# Patient Record
Sex: MALE | Race: WHITE | NOT HISPANIC OR LATINO
[De-identification: names, ages, dates, MRNs, and addresses within clinical notes are randomized per-mention and may not be internally consistent; named-entity substitution may affect disease eponyms.]

---

## 2017-01-27 ENCOUNTER — APPOINTMENT (OUTPATIENT)
Dept: NEUROLOGY | Facility: CLINIC | Age: 22
End: 2017-01-27

## 2017-09-04 ENCOUNTER — TRANSCRIPTION ENCOUNTER (OUTPATIENT)
Age: 22
End: 2017-09-04

## 2019-04-06 ENCOUNTER — EMERGENCY (EMERGENCY)
Facility: HOSPITAL | Age: 24
LOS: 1 days | Discharge: HOME | End: 2019-04-06
Attending: EMERGENCY MEDICINE
Payer: COMMERCIAL

## 2019-04-06 VITALS
DIASTOLIC BLOOD PRESSURE: 90 MMHG | RESPIRATION RATE: 17 BRPM | WEIGHT: 205.03 LBS | HEIGHT: 74 IN | TEMPERATURE: 98 F | HEART RATE: 88 BPM | SYSTOLIC BLOOD PRESSURE: 145 MMHG | OXYGEN SATURATION: 95 %

## 2019-04-06 VITALS
OXYGEN SATURATION: 96 % | RESPIRATION RATE: 18 BRPM | TEMPERATURE: 98 F | DIASTOLIC BLOOD PRESSURE: 71 MMHG | SYSTOLIC BLOOD PRESSURE: 126 MMHG | HEART RATE: 66 BPM

## 2019-04-06 DIAGNOSIS — Z98.89 OTHER SPECIFIED POSTPROCEDURAL STATES: Chronic | ICD-10-CM

## 2019-04-06 PROCEDURE — 99282 EMERGENCY DEPT VISIT SF MDM: CPT

## 2019-04-06 PROCEDURE — 99283 EMERGENCY DEPT VISIT LOW MDM: CPT

## 2019-04-06 NOTE — ED PROVIDER NOTE - NSFOLLOWUPINSTRUCTIONS_ED_ALL_ED_FT
See your Primary Doctor this week for follow up and reevaluation -- call to discuss.    Stay well hydrate, eat regular healthy meals, get plenty of adequate rest.    ACETAMINOPHEN and/or IBUPROFEN as directed for fever/pain -- see medication warnings.    Seek immediate medical care for new/worsening symptoms/concerns.

## 2019-04-06 NOTE — ED PROVIDER NOTE - NS ED ROS FT
Please see HPI section of chart for further detailed Review of Systems    fevers (max 102), chills, headache, nausea, body aches, congestion, cough (productive), mild photophobia, sore throat

## 2019-04-06 NOTE — ED PROVIDER NOTE - ATTENDING CONTRIBUTION TO CARE
------------ATTENDING NOTE------------   healthy vaccinated pt w/ mother sent to ED by Urgent Care, pt c/o < 24 hrs of nasal congestion, clear rhinorrhea, unproductive cough, fevers, gradual onset mild dull throbbing tension-type headache, significantly resolves w/ acetaminophen/ibuprofen, no underlying pulmonary disease, overall very well appearing, tolerating PO, clear MMM, clear TMs, clear chest w/o distress, normal cardiac exam, soft benign abdomen w/o mass/organomegaly, no rash or diffuse lymphadenopathy, c/w viral process, pt's mother very anxious and upset over limitation and lack of additional testing, nml VS at [place], in depth dw all about ddx, tx, cr, continued close outpt fu.  - Eugene Victor MD   -----------------------------------------------

## 2019-04-06 NOTE — ED ADULT NURSE NOTE - CHIEF COMPLAINT QUOTE
Cold like symptoms, fevers ans chills, denies neck stiffness. Sent by PMD for r/o meningitis, + photophobia and headache

## 2019-04-06 NOTE — ED PROVIDER NOTE - CLINICAL SUMMARY MEDICAL DECISION MAKING FREE TEXT BOX
23M w/ 1 day of fevers (max 102), chills, headache, nausea, body aches, congestion, cough, mild photophobia. Non toxic appearing, afebrile in ED. Exam non focal. Neck supple. Vitals wnl. High suspicion for viral illness. Will attempt PO challenge, anticipate patient will be d/c home with return precautions, anticipatory guidance, and instructed to f/u with primary medical doctor.   Caio Toro MD, PGY2 Emergency Medicine see attending note / orders for clinical course / interpretations

## 2019-04-06 NOTE — ED PROVIDER NOTE - OBJECTIVE STATEMENT
23M, hx depression/anxiety, presents to ED from urgent care with his mother for chief complaint of fever and viral-like illness. Patient stated that he had sore throat yesterday and went to urgent care where flu and rapid strep were negative. Overnight, patient reportedly developed fevers (max 102), chills, headache, nausea, body aches, congestion, cough (productive), mild photophobia. Went to Urgent Care again and was sent to ED for eval. Denies associated chest pain, shortness of breath, abdominal pain, diarrhea or constipation, dysuria, neck pain, neck stiffness, vomiting, AMS. No recent illness, travel. Attends college, lives at home. No flu shot this season. Took tylenol at 12pm, advil at 1:30pm. No longer febrile in ED.

## 2019-04-06 NOTE — ED PROVIDER NOTE - ADDITIONAL RISK FACTOR FREE TEXT BOX
23M w/ 1 day of fevers (max 102), chills, headache, nausea, body aches, congestion, cough, mild photophobia. Non toxic appearing, afebrile in ED. Exam non focal. Neck supple. Vitals wnl. High suspicion for viral illness. Will attempt PO challenge, anticipate patient will be d/c home with return precautions, anticipatory guidance, and instructed to f/u with primary medical doctor.   Caio Toro MD, PGY2 Emergency Medicine

## 2019-04-06 NOTE — ED ADULT NURSE NOTE - OBJECTIVE STATEMENT
24 yo M aaox4 fever, body aches and sore throat. Also complains of photophobia. Pt denies cp dizziness weakness or SOB.   Denies headache or change in vision. VS stable no acute distress.

## 2019-04-06 NOTE — ED PROVIDER NOTE - PROGRESS NOTE DETAILS
Spoke with Dr Mcintyre (PCP) who called to check in on patient while in ED  Will instruct patient to f/u with Dr Mcintyre this week for further care   Caio Toro MD, PGY2 Emergency Medicine

## 2019-04-06 NOTE — ED PROVIDER NOTE - MUSCULOSKELETAL, MLM
Spine appears normal, range of motion is not limited, no muscle or joint tenderness. Neck supple, full ROM of neck.

## 2019-04-06 NOTE — ED ADULT TRIAGE NOTE - CHIEF COMPLAINT QUOTE
Cold like symptoms, fevers ans chills, denies neck stiffness. Sent by PMD for r/o meningitis. Cold like symptoms, fevers ans chills, denies neck stiffness. Sent by PMD for r/o meningitis, + photophobia and headache

## 2020-10-15 ENCOUNTER — TRANSCRIPTION ENCOUNTER (OUTPATIENT)
Age: 25
End: 2020-10-15

## 2020-12-31 ENCOUNTER — TRANSCRIPTION ENCOUNTER (OUTPATIENT)
Age: 25
End: 2020-12-31

## 2022-02-19 ENCOUNTER — INPATIENT (INPATIENT)
Facility: HOSPITAL | Age: 27
LOS: 5 days | Discharge: HOME | DRG: 158 | End: 2022-02-25
Attending: INTERNAL MEDICINE | Admitting: INTERNAL MEDICINE
Payer: COMMERCIAL

## 2022-02-19 VITALS
DIASTOLIC BLOOD PRESSURE: 92 MMHG | TEMPERATURE: 99 F | HEART RATE: 96 BPM | SYSTOLIC BLOOD PRESSURE: 144 MMHG | HEIGHT: 74 IN | RESPIRATION RATE: 26 BRPM | OXYGEN SATURATION: 99 % | WEIGHT: 184.97 LBS

## 2022-02-19 DIAGNOSIS — Z98.89 OTHER SPECIFIED POSTPROCEDURAL STATES: Chronic | ICD-10-CM

## 2022-02-19 DIAGNOSIS — K12.30 ORAL MUCOSITIS (ULCERATIVE), UNSPECIFIED: ICD-10-CM

## 2022-02-19 LAB
ALBUMIN SERPL ELPH-MCNC: 5.2 G/DL — HIGH (ref 3.3–5)
ALP SERPL-CCNC: 59 U/L — SIGNIFICANT CHANGE UP (ref 40–120)
ALT FLD-CCNC: 9 U/L — LOW (ref 10–45)
ANION GAP SERPL CALC-SCNC: 16 MMOL/L — SIGNIFICANT CHANGE UP (ref 5–17)
APPEARANCE UR: CLEAR — SIGNIFICANT CHANGE UP
AST SERPL-CCNC: 8 U/L — LOW (ref 10–40)
BACTERIA # UR AUTO: NEGATIVE — SIGNIFICANT CHANGE UP
BASOPHILS # BLD AUTO: 0.03 K/UL — SIGNIFICANT CHANGE UP (ref 0–0.2)
BASOPHILS NFR BLD AUTO: 0.3 % — SIGNIFICANT CHANGE UP (ref 0–2)
BILIRUB SERPL-MCNC: 0.6 MG/DL — SIGNIFICANT CHANGE UP (ref 0.2–1.2)
BILIRUB UR-MCNC: NEGATIVE — SIGNIFICANT CHANGE UP
BUN SERPL-MCNC: 11 MG/DL — SIGNIFICANT CHANGE UP (ref 7–23)
CALCIUM SERPL-MCNC: 10.4 MG/DL — SIGNIFICANT CHANGE UP (ref 8.4–10.5)
CHLORIDE SERPL-SCNC: 97 MMOL/L — SIGNIFICANT CHANGE UP (ref 96–108)
CO2 SERPL-SCNC: 26 MMOL/L — SIGNIFICANT CHANGE UP (ref 22–31)
COLOR SPEC: YELLOW — SIGNIFICANT CHANGE UP
CREAT SERPL-MCNC: 1.04 MG/DL — SIGNIFICANT CHANGE UP (ref 0.5–1.3)
DIFF PNL FLD: NEGATIVE — SIGNIFICANT CHANGE UP
EOSINOPHIL # BLD AUTO: 0.21 K/UL — SIGNIFICANT CHANGE UP (ref 0–0.5)
EOSINOPHIL NFR BLD AUTO: 1.8 % — SIGNIFICANT CHANGE UP (ref 0–6)
EPI CELLS # UR: 2 /HPF — SIGNIFICANT CHANGE UP
GLUCOSE SERPL-MCNC: 97 MG/DL — SIGNIFICANT CHANGE UP (ref 70–99)
GLUCOSE UR QL: NEGATIVE — SIGNIFICANT CHANGE UP
HCT VFR BLD CALC: 45.6 % — SIGNIFICANT CHANGE UP (ref 39–50)
HGB BLD-MCNC: 15.3 G/DL — SIGNIFICANT CHANGE UP (ref 13–17)
HIV 1 & 2 AB SERPL IA.RAPID: SIGNIFICANT CHANGE UP
HYALINE CASTS # UR AUTO: 19 /LPF — HIGH (ref 0–2)
IMM GRANULOCYTES NFR BLD AUTO: 0.3 % — SIGNIFICANT CHANGE UP (ref 0–1.5)
KETONES UR-MCNC: ABNORMAL
LEUKOCYTE ESTERASE UR-ACNC: NEGATIVE — SIGNIFICANT CHANGE UP
LYMPHOCYTES # BLD AUTO: 1.81 K/UL — SIGNIFICANT CHANGE UP (ref 1–3.3)
LYMPHOCYTES # BLD AUTO: 15.3 % — SIGNIFICANT CHANGE UP (ref 13–44)
MCHC RBC-ENTMCNC: 29.1 PG — SIGNIFICANT CHANGE UP (ref 27–34)
MCHC RBC-ENTMCNC: 33.6 GM/DL — SIGNIFICANT CHANGE UP (ref 32–36)
MCV RBC AUTO: 86.9 FL — SIGNIFICANT CHANGE UP (ref 80–100)
MONOCYTES # BLD AUTO: 1.2 K/UL — HIGH (ref 0–0.9)
MONOCYTES NFR BLD AUTO: 10.2 % — SIGNIFICANT CHANGE UP (ref 2–14)
NEUTROPHILS # BLD AUTO: 8.53 K/UL — HIGH (ref 1.8–7.4)
NEUTROPHILS NFR BLD AUTO: 72.1 % — SIGNIFICANT CHANGE UP (ref 43–77)
NITRITE UR-MCNC: NEGATIVE — SIGNIFICANT CHANGE UP
NRBC # BLD: 0 /100 WBCS — SIGNIFICANT CHANGE UP (ref 0–0)
PH UR: 6 — SIGNIFICANT CHANGE UP (ref 5–8)
PLATELET # BLD AUTO: 330 K/UL — SIGNIFICANT CHANGE UP (ref 150–400)
POTASSIUM SERPL-MCNC: 3.5 MMOL/L — SIGNIFICANT CHANGE UP (ref 3.5–5.3)
POTASSIUM SERPL-SCNC: 3.5 MMOL/L — SIGNIFICANT CHANGE UP (ref 3.5–5.3)
PROT SERPL-MCNC: 8.6 G/DL — HIGH (ref 6–8.3)
PROT UR-MCNC: ABNORMAL
RAPID RVP RESULT: SIGNIFICANT CHANGE UP
RBC # BLD: 5.25 M/UL — SIGNIFICANT CHANGE UP (ref 4.2–5.8)
RBC # FLD: 11.9 % — SIGNIFICANT CHANGE UP (ref 10.3–14.5)
RBC CASTS # UR COMP ASSIST: 0 /HPF — SIGNIFICANT CHANGE UP (ref 0–4)
SARS-COV-2 RNA SPEC QL NAA+PROBE: SIGNIFICANT CHANGE UP
SODIUM SERPL-SCNC: 139 MMOL/L — SIGNIFICANT CHANGE UP (ref 135–145)
SP GR SPEC: 1.03 — HIGH (ref 1.01–1.02)
UROBILINOGEN FLD QL: NEGATIVE — SIGNIFICANT CHANGE UP
WBC # BLD: 11.81 K/UL — HIGH (ref 3.8–10.5)
WBC # FLD AUTO: 11.81 K/UL — HIGH (ref 3.8–10.5)
WBC UR QL: 4 /HPF — SIGNIFICANT CHANGE UP (ref 0–5)

## 2022-02-19 PROCEDURE — 99285 EMERGENCY DEPT VISIT HI MDM: CPT

## 2022-02-19 PROCEDURE — 71046 X-RAY EXAM CHEST 2 VIEWS: CPT | Mod: 26,59

## 2022-02-19 RX ORDER — LIDOCAINE 4 G/100G
10 CREAM TOPICAL ONCE
Refills: 0 | Status: COMPLETED | OUTPATIENT
Start: 2022-02-19 | End: 2022-02-19

## 2022-02-19 RX ORDER — SODIUM CHLORIDE 9 MG/ML
1000 INJECTION, SOLUTION INTRAVENOUS ONCE
Refills: 0 | Status: COMPLETED | OUTPATIENT
Start: 2022-02-19 | End: 2022-02-19

## 2022-02-19 RX ORDER — KETOROLAC TROMETHAMINE 30 MG/ML
15 SYRINGE (ML) INJECTION ONCE
Refills: 0 | Status: DISCONTINUED | OUTPATIENT
Start: 2022-02-19 | End: 2022-02-19

## 2022-02-19 RX ORDER — SODIUM CHLORIDE 9 MG/ML
1000 INJECTION, SOLUTION INTRAVENOUS
Refills: 0 | Status: DISCONTINUED | OUTPATIENT
Start: 2022-02-19 | End: 2022-02-25

## 2022-02-19 RX ADMIN — Medication 60 MILLIGRAM(S): at 21:41

## 2022-02-19 RX ADMIN — SODIUM CHLORIDE 1000 MILLILITER(S): 9 INJECTION, SOLUTION INTRAVENOUS at 20:41

## 2022-02-19 RX ADMIN — Medication 15 MILLIGRAM(S): at 20:49

## 2022-02-19 RX ADMIN — SODIUM CHLORIDE 1000 MILLILITER(S): 9 INJECTION, SOLUTION INTRAVENOUS at 21:43

## 2022-02-19 RX ADMIN — SODIUM CHLORIDE 125 MILLILITER(S): 9 INJECTION, SOLUTION INTRAVENOUS at 21:47

## 2022-02-19 NOTE — CHART NOTE - NSCHARTNOTEFT_GEN_A_CORE
HPI: 25 yo M with no PMH presenting for dysphagia and rash.     DERM CONSULT/HPI: Dermatology consulted for itchy rash for 5 days. On Monday, patient started getting bilateral eye redness, difficulty swallowing, and developed ulcers of the mouth and tongue, as well as scattered lesions on the body. Today, patient developed dysuria and blisters around the penis. Skin is not painful. Patient had COVID infection 2 weeks ago with mild URI symptoms, which self resolved. Of note, patient states he developed a similar type of rash and ulceration of the mouth when he had chlamydia pneumonia ~5 years ago.     ROS positive for chills, dysphagia over these past 2 weeks, and more recently developed dysuria yesterday. No SOB/wheezing/difficulty breathing/abdominal pain/nausea/vomiting/diarrhea. No travel/sick contacts.     Patient does not take any daily or over the counter medications. Patient received one dose of clindamycin yesterday, as well as Valtrex 2 days ago.     Objective data:   Afebrile, VSS   Labs pending      EXAM (per photos): erosions of the vermillion lips with serous exudate, white plaque of the tongue and erosions and bulla of the lateral aspects of the tongue, gray-white plaque with bulla of the glans of the penis, scattered vesicles on pink base of the upper extremities     ASSESSMENT/PLAN: Favor RIME (reactive infectious mucocutaneous eruption) 2/2 recent COVID infection given predominant mucosal > cutaneous involvement and with patient’s reported history of similar type of presentation with chlamydia pneumonia in the past. RIME has predominant mucosal involvement and minor skin manifestations and the latency times between symptoms of COVID-19 infection and mucosal eruptions range from 4 days to 12 weeks, with oral lesions appearing 4 to 7 days after systemic symptoms and resolving in 5 to 21 days.    Similar presentation and discussion reported in the literature: https://jamanetwork.com/journals/jamadermatology/fullarticle/0957272    Differential also includes mucosal dominant EM.     SJS much less likely given not classic cutaneous morphology (no dusky lesions, full thickness epidermal necrosis or sloughing), absence of skin pain and no antecedent medication culprit (patient received clindamycin and Valtrex in previous 2 days, typical medication culprit exposures in SJS occur 7-21 days prior to cutaneous eruption). RIME has predominant mucosal involvement and minor skin manifestations and the latency times between symptoms of COVID-19 infection and mucosal eruptions range from 4 days to 12 weeks, with oral lesions appearing 4 to 7 days after systemic symptoms and resolving in 5 to 21 days.    At this time:   - Recommend RVP   - Recommend HSV PCR from oral lesions   - Recommend mycoplasma IgG/IgM   - Recommend starting prednisone at 80 mg or 65 mg IV solumedrol if patient does not tolerate PO   - Recommend magic mouthwash   - Consideration for ophthalmology evaluation for conjunctivitis   - Dermatology will continue to follow   - If patient develops skin pain, significant progression of rash, sloughing of skin (so that underlying skin appears as hamburger meat), necrosis, please page dermatology     The patient's case and photos were reviewed with the dermatology attending Dr. Ness. Recommendations were communicated with the primary team.  Please page [phone] w/10 digit call back number for further related questions.    Meche Holden MD  Resident Physician, PGY2  [facility]  Pager: [phone]  Office: [phone]

## 2022-02-19 NOTE — ED ADULT TRIAGE NOTE - CHIEF COMPLAINT QUOTE
bilateral eye swelling/redness, no PO intake x1 week, dry white rash around mouth, recent travel from [place]  hx covid positive 2 weeks ago

## 2022-02-19 NOTE — ED ADULT NURSE NOTE - OBJECTIVE STATEMENT
26 y.o. male coming in from home via private car for painful mouth lesions and full body rashes x 5 days. pt states that he has not had anything to eat since Monday r/t the painful lesions in his mouth and has not had anything to drink in 2 days. pt denies PMH or any daily medications. A&Ox3, vitals stable, lung sounds clear bilaterally, no abdominal tenderness on palpation. Circular/red/painful/itchy rashes noted throughout the body, no discharge/heat noted from rash sites. bed in lowest position, no other complaints at this time.

## 2022-02-19 NOTE — ED PROVIDER NOTE - CLINICAL SUMMARY MEDICAL DECISION MAKING FREE TEXT BOX
Attending MD Breaux:  26M presenting with 5 days of b/l eye redness, lip lesions, oral lesions (painful), generalized pruritic rash and now rash to glans of penis with dysuria. Patient with reported COVID-19 infection 2 weeks prior (mild). Ddx broad but includes behcet's syndrome, covid-related mucocutaneous disorder, less likely SJS but cannot exclude given mucosal findings. Plan for derm consult, labs, IV fluids, analgesia, reassess      *The above represents an initial assessment/impression. Please refer to progress notes for potential changes in patient clinical course*

## 2022-02-19 NOTE — ED PROVIDER NOTE - ATTENDING CONTRIBUTION TO CARE
Attending MD Breaux:   I personally have seen and examined this patient. I have performed a substantive portion of the visit including all aspects of the medical decision making.   Physician assistant note reviewed and agree on plan of care and except where noted.  See HPI, PE, and MDM for details.

## 2022-02-19 NOTE — ED PROVIDER NOTE - NSICDXPASTSURGICALHX_GEN_ALL_CORE_FT
PAST SURGICAL HISTORY:  H/O hand surgery Left pinky    H/O shoulder surgery Right, x 2 associated with sports injuries

## 2022-02-19 NOTE — ED PROVIDER NOTE - PHYSICAL EXAMINATION
CONSTITUTIONAL: Patient is awake, alert and oriented x 3. Patient is well appearing and in no acute distress.  HEAD: NCAT  EYES: b/l conjunctiva injected   ENT: airway patent, nasal mucosa clear. lips with mucosal lesions. white mucosal lesions in hard palate and uvula is midline.  NECK: swollen b/l, no lymphadenopathy   LUNGS: CTA B/L, no wheezing/rales appreciated  HEART: RRR.+S1S2 no murmurs appreciated  : bullae on head of penis  EXTREMITY: no edema or calf tenderness b/l  SKIN:  maculopapular pruritic lesions scattered on R-arm, b/l ankles, R-wrist, corpus CONSTITUTIONAL: Patient is awake, alert and oriented x 3. Patient is well appearing and in no acute distress.  HEAD: NCAT  EYES: b/l conjunctiva injected  ENT: airway patent, nasal mucosa clear. lips with white ulcerative lesions, tongue  hard palate with white ulcerative mucosal lesions, and uvula is midline.  NECK: swollen b/l, no lymphadenopathy   LUNGS: CTA B/L, no wheezing/rales appreciated  HEART: RRR.+S1S2 no murmurs appreciated  : bullae on head of penis  EXTREMITY: no edema or calf tenderness b/l  SKIN:  maculopapular pruritic lesions scattered on R-arm, b/l ankles, R-wrist, corpus CONSTITUTIONAL: Patient is awake, alert and oriented x 3. Patient is well appearing and in no acute distress.  HEAD: NCAT  EYES: b/l conjunctiva injected  ENT: airway patent, nasal mucosa clear. sloughing of mucosa on lips, vesicular lesions on lateral aspect of tongue, white ulcerative mucosal lesions scattered across hard palate, and uvula is midline.  NECK: swollen b/l  LUNGS: CTA B/L, no wheezing/rales appreciated  HEART: RRR.+S1S2 no murmurs appreciated  : bullae/sloughing of skin on head of penis  EXTREMITY: no edema or calf tenderness b/l  SKIN: pruritic target lesions scattered on b/l hand/arm, corpus, b/l ankles

## 2022-02-19 NOTE — ED PROVIDER NOTE - PROGRESS NOTE DETAILS
Consulted dermatology, pt verbally consented to having pictures taken of oral/genital/extremity regions for consulting team, imaging sent via teams to Dr. Holden. After evaluation of images sent and discussion with Derm attending Dr. Holden advised pt likely with RIME possibly related to recent COVID vs other viral infection. Recommended pt received oral mouth wash which pt declined and be started on Prednisone 80mg or IV equivalent of Solumedrol. Decision made to give IV steroids given pt unable to tolerate PO. Spoke to PCP Dr. Abraham Mcintyre in regards to case, states he uses Dr. Ruiz for admission. Pt accepted to Dr. Ruiz service for hydration, symptom management and Derm to see in am   Jacquelin Buckner PA-C Pt was seen again to assess pain, s/p ketorolac injection, noted feeling the same without relief. Offered pt the visceral lidocaine swish again but refused again because he stated it would hurt his mouth to take anything PO.

## 2022-02-19 NOTE — ED PROVIDER NOTE - OBJECTIVE STATEMENT
25 yo male with no significant PMHx presented with b/l eye redness, b/l neck swelling, difficulty/painful swallowing, lesions on his lips/tongue, pruritic lesions in hands/feet/body, lesions on the head of his penis for 5 days now. Pt notably had COVID infection 2 weeks ago with mild URI symptoms, resolved on Monday, which is when all these symptoms reportedly appeared. Pt also noted having dysuria today. Pt reported being sexually active with females (~5 partners in past year, with some condom usage). Of note pt had similar presentation in 2015 with Chlamydia pneumoniae but pt stated that he did not have a rash last time. 25 yo male with no significant PMHx presented with b/l eye redness, b/l neck swelling, difficulty/painful swallowing, lesions on his lips/tongue, pruritic lesions in hands/feet/body, lesions on the head of his penis for 5 days now. Pt notably had COVID infection 2 weeks ago with mild URI symptoms, resolved on Monday, which is when all these symptoms reportedly appeared. Pt also noted having dysuria and chills today. Pt reported being sexually active with females (~5 partners in past year, with some condom usage). Pt mentioned going to [facility] 3 days ago and taking Valtrex. Also reported going to the hospital, who informed him that it may be sequelae of COVID, pt took clindamycin x1 day. Of note pt had similar presentation (neck swelling, painful swallowing) in 2015 with Chlamydia pneumoniae but pt stated that he did not have a rash last time. Denied SOB, chest pain, fever, abdominal pain, or urinary incontinence.

## 2022-02-20 DIAGNOSIS — R21 RASH AND OTHER NONSPECIFIC SKIN ERUPTION: ICD-10-CM

## 2022-02-20 DIAGNOSIS — Z29.9 ENCOUNTER FOR PROPHYLACTIC MEASURES, UNSPECIFIED: ICD-10-CM

## 2022-02-20 LAB
ANION GAP SERPL CALC-SCNC: 14 MMOL/L — SIGNIFICANT CHANGE UP (ref 5–17)
BUN SERPL-MCNC: 11 MG/DL — SIGNIFICANT CHANGE UP (ref 7–23)
CALCIUM SERPL-MCNC: 9.9 MG/DL — SIGNIFICANT CHANGE UP (ref 8.4–10.5)
CHLORIDE SERPL-SCNC: 100 MMOL/L — SIGNIFICANT CHANGE UP (ref 96–108)
CO2 SERPL-SCNC: 24 MMOL/L — SIGNIFICANT CHANGE UP (ref 22–31)
CREAT SERPL-MCNC: 0.9 MG/DL — SIGNIFICANT CHANGE UP (ref 0.5–1.3)
EBV EA AB SER IA-ACNC: <5 U/ML — SIGNIFICANT CHANGE UP
EBV EA AB TITR SER IF: NEGATIVE — SIGNIFICANT CHANGE UP
EBV EA IGG SER-ACNC: NEGATIVE — SIGNIFICANT CHANGE UP
EBV NA IGG SER IA-ACNC: 13.4 U/ML — SIGNIFICANT CHANGE UP
EBV PATRN SPEC IB-IMP: SIGNIFICANT CHANGE UP
EBV VCA IGG AVIDITY SER QL IA: POSITIVE
EBV VCA IGM SER IA-ACNC: 136 U/ML — HIGH
EBV VCA IGM SER IA-ACNC: 27.6 U/ML — SIGNIFICANT CHANGE UP
EBV VCA IGM TITR FLD: NEGATIVE — SIGNIFICANT CHANGE UP
GLUCOSE SERPL-MCNC: 112 MG/DL — HIGH (ref 70–99)
HSV+VZV DNA SPEC QL NAA+PROBE: SIGNIFICANT CHANGE UP
MAGNESIUM SERPL-MCNC: 2.3 MG/DL — SIGNIFICANT CHANGE UP (ref 1.6–2.6)
PHOSPHATE SERPL-MCNC: 3.7 MG/DL — SIGNIFICANT CHANGE UP (ref 2.5–4.5)
POTASSIUM SERPL-MCNC: 4.5 MMOL/L — SIGNIFICANT CHANGE UP (ref 3.5–5.3)
POTASSIUM SERPL-SCNC: 4.5 MMOL/L — SIGNIFICANT CHANGE UP (ref 3.5–5.3)
SODIUM SERPL-SCNC: 138 MMOL/L — SIGNIFICANT CHANGE UP (ref 135–145)
SPECIMEN SOURCE: SIGNIFICANT CHANGE UP

## 2022-02-20 PROCEDURE — 99222 1ST HOSP IP/OBS MODERATE 55: CPT

## 2022-02-20 PROCEDURE — 99223 1ST HOSP IP/OBS HIGH 75: CPT

## 2022-02-20 RX ORDER — DIPHENHYDRAMINE HYDROCHLORIDE AND LIDOCAINE HYDROCHLORIDE AND ALUMINUM HYDROXIDE AND MAGNESIUM HYDRO
5 KIT EVERY 4 HOURS
Refills: 0 | Status: DISCONTINUED | OUTPATIENT
Start: 2022-02-20 | End: 2022-02-21

## 2022-02-20 RX ORDER — KETOROLAC TROMETHAMINE 30 MG/ML
15 SYRINGE (ML) INJECTION EVERY 6 HOURS
Refills: 0 | Status: DISCONTINUED | OUTPATIENT
Start: 2022-02-20 | End: 2022-02-22

## 2022-02-20 RX ORDER — KETOROLAC TROMETHAMINE 30 MG/ML
30 SYRINGE (ML) INJECTION EVERY 6 HOURS
Refills: 0 | Status: DISCONTINUED | OUTPATIENT
Start: 2022-02-20 | End: 2022-02-20

## 2022-02-20 RX ORDER — MORPHINE SULFATE 50 MG/1
2 CAPSULE, EXTENDED RELEASE ORAL EVERY 4 HOURS
Refills: 0 | Status: DISCONTINUED | OUTPATIENT
Start: 2022-02-20 | End: 2022-02-25

## 2022-02-20 RX ORDER — DIPHENHYDRAMINE HYDROCHLORIDE AND LIDOCAINE HYDROCHLORIDE AND ALUMINUM HYDROXIDE AND MAGNESIUM HYDRO
30 KIT EVERY 4 HOURS
Refills: 0 | Status: DISCONTINUED | OUTPATIENT
Start: 2022-02-20 | End: 2022-02-20

## 2022-02-20 RX ORDER — MORPHINE SULFATE 50 MG/1
0.5 CAPSULE, EXTENDED RELEASE ORAL
Refills: 0 | Status: DISCONTINUED | OUTPATIENT
Start: 2022-02-20 | End: 2022-02-20

## 2022-02-20 RX ORDER — LIDOCAINE 4 G/100G
10 CREAM TOPICAL EVERY 4 HOURS
Refills: 0 | Status: DISCONTINUED | OUTPATIENT
Start: 2022-02-20 | End: 2022-02-25

## 2022-02-20 RX ORDER — LANOLIN ALCOHOL/MO/W.PET/CERES
3 CREAM (GRAM) TOPICAL AT BEDTIME
Refills: 0 | Status: DISCONTINUED | OUTPATIENT
Start: 2022-02-20 | End: 2022-02-25

## 2022-02-20 RX ORDER — MORPHINE SULFATE 50 MG/1
1 CAPSULE, EXTENDED RELEASE ORAL EVERY 4 HOURS
Refills: 0 | Status: DISCONTINUED | OUTPATIENT
Start: 2022-02-20 | End: 2022-02-25

## 2022-02-20 RX ORDER — DIPHENHYDRAMINE HCL 50 MG
25 CAPSULE ORAL EVERY 6 HOURS
Refills: 0 | Status: DISCONTINUED | OUTPATIENT
Start: 2022-02-20 | End: 2022-02-25

## 2022-02-20 RX ORDER — MORPHINE SULFATE 50 MG/1
1 CAPSULE, EXTENDED RELEASE ORAL EVERY 4 HOURS
Refills: 0 | Status: DISCONTINUED | OUTPATIENT
Start: 2022-02-20 | End: 2022-02-20

## 2022-02-20 RX ORDER — LIDOCAINE 4 G/100G
10 CREAM TOPICAL ONCE
Refills: 0 | Status: COMPLETED | OUTPATIENT
Start: 2022-02-20 | End: 2022-02-20

## 2022-02-20 RX ORDER — SENNA PLUS 8.6 MG/1
2 TABLET ORAL AT BEDTIME
Refills: 0 | Status: DISCONTINUED | OUTPATIENT
Start: 2022-02-20 | End: 2022-02-25

## 2022-02-20 RX ORDER — ENOXAPARIN SODIUM 100 MG/ML
40 INJECTION SUBCUTANEOUS DAILY
Refills: 0 | Status: DISCONTINUED | OUTPATIENT
Start: 2022-02-20 | End: 2022-02-25

## 2022-02-20 RX ORDER — ERYTHROMYCIN BASE 5 MG/GRAM
1 OINTMENT (GRAM) OPHTHALMIC (EYE)
Refills: 0 | Status: DISCONTINUED | OUTPATIENT
Start: 2022-02-20 | End: 2022-02-21

## 2022-02-20 RX ORDER — ACETAMINOPHEN 500 MG
650 TABLET ORAL EVERY 6 HOURS
Refills: 0 | Status: DISCONTINUED | OUTPATIENT
Start: 2022-02-20 | End: 2022-02-25

## 2022-02-20 RX ADMIN — MORPHINE SULFATE 1 MILLIGRAM(S): 50 CAPSULE, EXTENDED RELEASE ORAL at 11:22

## 2022-02-20 RX ADMIN — Medication 15 MILLIGRAM(S): at 23:13

## 2022-02-20 RX ADMIN — Medication 15 MILLIGRAM(S): at 13:33

## 2022-02-20 RX ADMIN — DIPHENHYDRAMINE HYDROCHLORIDE AND LIDOCAINE HYDROCHLORIDE AND ALUMINUM HYDROXIDE AND MAGNESIUM HYDRO 5 MILLILITER(S): KIT at 23:12

## 2022-02-20 RX ADMIN — Medication 30 MILLIGRAM(S): at 02:29

## 2022-02-20 RX ADMIN — MORPHINE SULFATE 1 MILLIGRAM(S): 50 CAPSULE, EXTENDED RELEASE ORAL at 15:27

## 2022-02-20 RX ADMIN — Medication 15 MILLIGRAM(S): at 18:08

## 2022-02-20 RX ADMIN — Medication 1 APPLICATION(S): at 17:49

## 2022-02-20 RX ADMIN — Medication 1 APPLICATION(S): at 13:20

## 2022-02-20 RX ADMIN — SENNA PLUS 2 TABLET(S): 8.6 TABLET ORAL at 22:00

## 2022-02-20 RX ADMIN — Medication 30 MILLIGRAM(S): at 03:25

## 2022-02-20 RX ADMIN — LIDOCAINE 10 MILLILITER(S): 4 CREAM TOPICAL at 17:48

## 2022-02-20 RX ADMIN — Medication 15 MILLIGRAM(S): at 23:30

## 2022-02-20 RX ADMIN — ENOXAPARIN SODIUM 40 MILLIGRAM(S): 100 INJECTION SUBCUTANEOUS at 13:38

## 2022-02-20 RX ADMIN — Medication 65 MILLIGRAM(S): at 11:22

## 2022-02-20 RX ADMIN — LIDOCAINE 10 MILLILITER(S): 4 CREAM TOPICAL at 23:11

## 2022-02-20 RX ADMIN — DIPHENHYDRAMINE HYDROCHLORIDE AND LIDOCAINE HYDROCHLORIDE AND ALUMINUM HYDROXIDE AND MAGNESIUM HYDRO 5 MILLILITER(S): KIT at 17:49

## 2022-02-20 RX ADMIN — LIDOCAINE 10 MILLILITER(S): 4 CREAM TOPICAL at 13:21

## 2022-02-20 RX ADMIN — MORPHINE SULFATE 1 MILLIGRAM(S): 50 CAPSULE, EXTENDED RELEASE ORAL at 20:09

## 2022-02-20 RX ADMIN — MORPHINE SULFATE 0.5 MILLIGRAM(S): 50 CAPSULE, EXTENDED RELEASE ORAL at 09:38

## 2022-02-20 RX ADMIN — DIPHENHYDRAMINE HYDROCHLORIDE AND LIDOCAINE HYDROCHLORIDE AND ALUMINUM HYDROXIDE AND MAGNESIUM HYDRO 5 MILLILITER(S): KIT at 13:20

## 2022-02-20 NOTE — H&P ADULT - NSHPPHYSICALEXAM_GEN_ALL_CORE
Vital Signs Last 24 Hrs  T(C): 37 (20 Feb 2022 11:51), Max: 37.7 (20 Feb 2022 00:45)  T(F): 98.6 (20 Feb 2022 11:51), Max: 99.9 (20 Feb 2022 00:45)  HR: 66 (20 Feb 2022 11:51) (66 - 97)  BP: 131/74 (20 Feb 2022 11:51) (131/74 - 149/83)  BP(mean): --  RR: 18 (20 Feb 2022 11:51) (18 - 26)  SpO2: 98% (20 Feb 2022 11:51) (97% - 99%)    CONSTITUTIONAL: Well-developed, well-groomed, in some pain  EYES: +scleral injection b/l. PERRLA and symmetric  ENMT: No external nasal lesions; nasal mucosa not inflamed; normal dentition; oral mucosa w/ edema and scattered erosions w serous exudate, white erosions and bulla of tongue  RESPIRATORY: Breathing comfortably; lungs CTA without wheeze/rhonchi/rales  CARDIOVASCULAR: +S1S2, RRR, no M/G/R; pedal pulses full and symmetric; no lower extremity edema  GASTROINTESTINAL: No palpable masses or tenderness, +BS throughout, no rebound/guarding; no hernia palpated  MUSCULOSKELETAL: No digital clubbing or cyanosis; no paraspinal tenderness; no joint effusions of upper or lower extremities; normal strength and tone of extremities  SKIN: few and scattered target-like lesions measuring <1cm in size in varying stages of healing on arms/trunk/legs  NEUROLOGIC: sensation intact in LEs b/l to light touch  PSYCHIATRIC: A+O x 3; mood and affect appropriate; appropriate insight and judgment

## 2022-02-20 NOTE — PROGRESS NOTE ADULT - SUBJECTIVE AND OBJECTIVE BOX
pt seen and examined  labs, vitals , consults reviewed   discussed management plan in detail in length with pt   all questions/ concerns addressed   [facility] code [id number]

## 2022-02-20 NOTE — H&P ADULT - PROBLEM SELECTOR PLAN 1
DDx: RIME (reactive infectious mucocutaneous eruption) 2/2 recent COVID infection vs Erythema multiforme 2/2 HSV given target-like lesions vs less likely SJS  - d/w derm, to see patient today - appreciate recs  - solumedrol 65 mg iv qd (started 2/19)  - symptomatic support - lidocaine viscous solution q4h, mouthwash q4h  - pain control - toradol q6 ATC, morphine 1 mg/2mg q4h prn mod/severe pain  - senna while on opioids  - clear diet - pt cannot tolerate regular 2/2 pain   - d/w opthal - no involvement seen, use ppx erythromycin ointment  - F/U HSV PCR - if +, start acyclovir iv  - F/U mycoplasma ig, chlam/g/c  - monitor leukocytosis - likely 2/2 recent medrol pack and stress reaction  - meets SIRS criteria though no active infection  - ivf while pt cannot tolerate diet

## 2022-02-20 NOTE — H&P ADULT - NSHPREVIEWOFSYSTEMS_GEN_ALL_CORE
Review of Systems:   CONSTITUTIONAL: +CHILLS. No fever, weight loss  EYES: No eye pain, visual disturbances, or discharge  ENMT:  No difficulty hearing, tinnitus, vertigo; +THROAT PAIN.  RESPIRATORY: No SOB. No cough, wheezing, chills or hemoptysis  CARDIOVASCULAR: No chest pain, palpitations, dizziness, or leg swelling  GASTROINTESTINAL: No abdominal or epigastric pain. No nausea, vomiting, or hematemesis; No diarrhea or constipation. No melena or hematochezia.  GENITOURINARY: +DYSURIA. No  frequency, hematuria, or incontinence  NEUROLOGICAL: No headaches, memory loss, loss of strength, numbness, or tremors  SKIN: +rash  ENDOCRINE: No heat or cold intolerance; No hair loss  MUSCULOSKELETAL: No joint pain or swelling; No muscle, back pain  PSYCHIATRIC: No depression, anxiety, mood swings, or difficulty sleeping  HEME/LYMPH: No easy bruising, or bleeding gums

## 2022-02-20 NOTE — CONSULT NOTE ADULT - ASSESSMENT
Hypersensitivity reaction with targetoid lesions and mucosal involvement.     Given atypical papular targetoid lesions with two zones of color, history of HSV infection, and predominant mucosal involvement, differential includes mucosal dominant erythema multiforme (though HSV swab collected is negative), vs RIME (reactive infectious mucocutaneous eruption) 2/2 recent COVID infection given predominant mucosal > cutaneous involvement and with patient’s reported history of similar type of presentation with chlamydia pneumonia in the past. RIME has predominant mucosal involvement and minor skin manifestations and the latency times between symptoms of COVID-19 infection and mucosal eruptions range from 4 days to 12 weeks, with oral lesions appearing 4 to 7 days after systemic symptoms and resolving in 5 to 21 days.    SJS much less likely given not classic cutaneous morphology (no dusky lesions, full thickness epidermal necrosis or sloughing), absence of skin pain and no likely antecedent medication culprit.      At this time:   - F/u mycoplasma IgG/IgM   - Given patient is still symptomatic, recommend increasing dose of methylprednisolone to 100 mg IV tomorrow. Will reassess tomorrow to determine additional dosing going forward    - Recommend continuing magic mouthwash   - If patient develops skin pain, significant progression of rash, sloughing of skin (so that underlying skin appears as hamburger meat), necrosis, please page dermatology     The patient's case was reviewed with the dermatology attending Dr. Ness. Recommendations were communicated with the primary team.  Please page [phone] w/10 digit call back number for further related questions.    Meche Holden MD  Resident Physician, PGY2  [facility]  Pager: [phone]  Office: [phone]

## 2022-02-20 NOTE — CONSULT NOTE ADULT - SUBJECTIVE AND OBJECTIVE BOX
HPI: 27 yo M with no PMH presenting for dysphagia and rash.     DERM CONSULT/HPI: Dermatology consulted for itchy rash for 6 days. On Monday, patient started getting bilateral eye redness, difficulty swallowing, and developed ulcers of the mouth and tongue, as well as scattered lesions on the body. Yesterday, patient developed dysuria and blisters around the penis. Skin is not painful. Patient had COVID infection 2 weeks ago with mild URI symptoms, which self resolved. Of note, patient states he developed a similar type of rash and ulceration of the mouth when he had chlamydia pneumonia ~5 years ago. 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 ROS positive for chills, dysphagia over these past 2 weeks, and more recently developed dysuria yesterday. No SOB/wheezing/difficulty breathing/abdominal pain/nausea/vomiting/diarrhea. No travel/sick contacts.     Patient does not take any daily or over the counter medications. Patient completed 5 day course of azithromycin from -, and took 3 days of Valtrex from -, as well as one dose of doxycycline  and one dose of clindamycin 2/15.     Today, patient is most concerned regarding his mouth, with difficulty taking PO. He is now s/p one dose of 60 mg IV solumedrol and feels some minimal improvement. Feels the mouthwash is the most helpful. Patient has been evaluated by opthalmology who recommended erythromycin ointment as well as infectious disease who did not recommend antibiotic therapy.       PAST MEDICAL & SURGICAL HISTORY:  No pertinent past medical history    H/O shoulder surgery  Right, x 2 associated with sports injuries    H/O hand surgery  Left pinky        REVIEW OF SYSTEMS    General: +fevers/chills, no lethargy	    Skin/Breast: see HPI  	  Ophthalmologic: irritation/itchiness of the eyes  	  ENMT: +dysphagia, unable to tolerate PO     Respiratory and Thorax: no SOB or cough  	  Cardiovascular: no palpitations or chest pain    Gastrointestinal: no abdominal pain or blood in stool     Genitourinary: +dysuria     Musculoskeletal: no joint pains or weakness	    Neurological: no weakness, numbness, or tingling      MEDICATIONS  (STANDING):  enoxaparin Injectable 40 milliGRAM(s) SubCutaneous daily  erythromycin   Ointment 1 Application(s) Both EYES four times a day  FIRST- Mouthwash  BLM 5 milliLiter(s) Swish and Spit every 4 hours  ketorolac   Injectable 15 milliGRAM(s) IV Push every 6 hours  lactated ringers. 1000 milliLiter(s) (125 mL/Hr) IV Continuous <Continuous>  lidocaine 2% Viscous 10 milliLiter(s) Swish and Spit every 4 hours  senna 2 Tablet(s) Oral at bedtime    MEDICATIONS  (PRN):  acetaminophen     Tablet .. 650 milliGRAM(s) Oral every 6 hours PRN Temp greater or equal to 38C (100.4F), Mild Pain (1 - 3)  artificial tears (preservative free) Ophthalmic Solution 1 Drop(s) Both EYES every 1 hour PRN Dry Eyes  diphenhydrAMINE 25 milliGRAM(s) Oral every 6 hours PRN Rash and/or Itching  melatonin 3 milliGRAM(s) Oral at bedtime PRN Insomnia  morphine  - Injectable 2 milliGRAM(s) IV Push every 4 hours PRN Severe Pain (7 - 10)  morphine  - Injectable 1 milliGRAM(s) IV Push every 4 hours PRN Moderate Pain (4 - 6)      Allergies    amoxicillin (Hives)  cephalosporins (Hives)  cephalosporins (Unknown)    Intolerances        SOCIAL HISTORY:    FAMILY HISTORY:  No pertinent family history in first degree relatives        Vital Signs Last 24 Hrs  T(C): 37 (2022 11:51), Max: 37.7 (2022 00:45)  T(F): 98.6 (2022 11:51), Max: 99.9 (2022 00:45)  HR: 66 (2022 11:51) (66 - 97)  BP: 131/74 (2022 11:51) (131/74 - 149/83)  BP(mean): --  RR: 18 (2022 11:51) (18 - 22)  SpO2: 98% (2022 11:51) (97% - 99%)    PHYSICAL EXAM:    General: Well appearing, well nourished, in no apparent distress  HEENT: Normocephalic, thyroid not visibly enlarged, conjunctiva not injected, oropharynx clear without ulcerations  CV: No lower extremity edema, extremities warm and well perfused, +dorsalis pedis pulses  Resp: Non labored breathing, no clubbing of extremities  GI: Non distended abdomen, no palpable hepatosplenomegaly  Lymph: No visible lymphadenopathy  Neuro: Alert and oriented x3  Skin: The scalp/hair, head/face, conjunctivae/lids, lips/teeth/gums, neck, digits/nails, right and left axilla, right and left upper and lower extremities, chest, abdomen, back, buttocks and groin area. No bromhidrosis or hyperhidrosis.    Of note on skin exam:  - erosions of the vermillion lips with serous exudate  - white plaque of the tongue and erosions the lateral aspects of the tongue  - gray-white plaque with bulla of the glans of the penis  - scattered papular targetoid lesions with two zones of color of the trunk and extremities  - edema of the lower eyelids with redness of conjunctiva bilaterally       LABS:                        15.3   11.81 )-----------( 330      ( 2022 20:18 )             45.6     02-    138  |  100  |  11  ----------------------------<  112<H>  4.5   |  24  |  0.90    Ca    9.9      2022 07:03  Phos  3.7     -  Mg     2.3         TPro  8.6<H>  /  Alb  5.2<H>  /  TBili  0.6  /  DBili  x   /  AST  8<L>  /  ALT  9<L>  /  AlkPhos  59  -      Urinalysis Basic - ( 2022 20:57 )    Color: Yellow / Appearance: Clear / S.032 / pH: x  Gluc: x / Ketone: Moderate  / Bili: Negative / Urobili: Negative   Blood: x / Protein: 30 mg/dL / Nitrite: Negative   Leuk Esterase: Negative / RBC: 0 /hpf / WBC 4 /HPF   Sq Epi: x / Non Sq Epi: 2 /hpf / Bacteria: Negative        RADIOLOGY & ADDITIONAL STUDIES:    MICRO:   HSV/VZV PCR negative   RVP neg   COVID neg   EBV IgG positive

## 2022-02-20 NOTE — H&P ADULT - HISTORY OF PRESENT ILLNESS
25 yo M recent COVID infection 2 weeks prior and no PMH p/w itchy rash for 5 days. States symptoms began with b/l red eyes and progressed to scattered lesions throughout arms, legs, abdomen, and body. He noticed ulcers in his mouth and tongue, difficulty swallowing, reports not having eaten for 5 days now due to pain. On day of admission, he noticed blisters on his penis with associated dysuria. He states skin lesions itch occasionally and are sometimes midly painful. He had a similar presentation with oral ulcers when he was diagnosed with chlamydia pna 5 years ago. He denies known sick contacts. Denies n/v, fevers, cp, sob, abd pain, diarrhea, constipation. He was seen in urgent care clinic and given a medrol dose pack, valtrex, and clindamycin but symptoms persisted and worsened so he came to the ED.      In the ED, afebrile HR 90s, RR 20s  Meds received LR 1L, lidocaine viscous x 1, solumedrol 60 mg iv

## 2022-02-20 NOTE — H&P ADULT - NSHPLABSRESULTS_GEN_ALL_CORE
LABS:                         15.3   11.81 )-----------( 330      ( 2022 20:18 )             45.6     -    138  |  100  |  11  ----------------------------<  112<H>  4.5   |  24  |  0.90    Ca    9.9      2022 07:03  Phos  3.7       Mg     2.3         TPro  8.6<H>  /  Alb  5.2<H>  /  TBili  0.6  /  DBili  x   /  AST  8<L>  /  ALT  9<L>  /  AlkPhos  59        Urinalysis Basic - ( 2022 20:57 )    Color: Yellow / Appearance: Clear / S.032 / pH: x  Gluc: x / Ketone: Moderate  / Bili: Negative / Urobili: Negative   Blood: x / Protein: 30 mg/dL / Nitrite: Negative   Leuk Esterase: Negative / RBC: 0 /hpf / WBC 4 /HPF   Sq Epi: x / Non Sq Epi: 2 /hpf / Bacteria: Negative    CXR personally reviewed CLEAR, NO INFILTRATE

## 2022-02-20 NOTE — H&P ADULT - ASSESSMENT
25 yo M recent COVID19 infection 2 weeks prior and no PMH p/w 5 days of rash with oral involvement.    MEDICATIONS  (STANDING):  enoxaparin Injectable 40 milliGRAM(s) SubCutaneous daily  erythromycin   Ointment 1 Application(s) Both EYES four times a day  FIRST- Mouthwash  BLM 5 milliLiter(s) Swish and Spit every 4 hours  ketorolac   Injectable 15 milliGRAM(s) IV Push every 6 hours  lactated ringers. 1000 milliLiter(s) (125 mL/Hr) IV Continuous <Continuous>  lidocaine 2% Viscous 10 milliLiter(s) Swish and Spit every 4 hours  methylPREDNISolone sodium succinate Injectable 65 milliGRAM(s) IV Push daily  senna 2 Tablet(s) Oral at bedtime    MEDICATIONS  (PRN):  acetaminophen     Tablet .. 650 milliGRAM(s) Oral every 6 hours PRN Temp greater or equal to 38C (100.4F), Mild Pain (1 - 3)  artificial tears (preservative free) Ophthalmic Solution 1 Drop(s) Both EYES every 1 hour PRN Dry Eyes  diphenhydrAMINE 25 milliGRAM(s) Oral every 6 hours PRN Rash and/or Itching  melatonin 3 milliGRAM(s) Oral at bedtime PRN Insomnia  morphine  - Injectable 2 milliGRAM(s) IV Push every 4 hours PRN Severe Pain (7 - 10)  morphine  - Injectable 1 milliGRAM(s) IV Push every 4 hours PRN Moderate Pain (4 - 6)   25 yo M recent COVID19 infection 2 weeks prior and no PMH p/w 5 days of rash with oral involvement.

## 2022-02-20 NOTE — CONSULT NOTE ADULT - ASSESSMENT
INCOMPLETE NOTE, FINAL RECS TO FOLLOW    Assessment and Recommendations:  26y male w/ no significant pmhx/ochx consulted for red eyes. On exam, patient's visual acuity was 20/20 in the right eye, 20/20 in the left eye. There was no APD. IOP were measured to be 16 right eye, 16 left eye. Extraocular movements, confrontational visual fields, and color plates were full in both eyes. Anterior segment exam with penlight revealed fluorescein uptake on bulbar conj nasally, temporally, inferiorly OD, nasally OS; no conj sloughing. Posterior segment exam with 20D lens after dilation revealed no acute abnormalities.   # Presumed RIME  - VA intact; EOMs full; fornices swept with no pseudomembranes; no epi defect on corneas; no conj sloughing; no discharge  - Fluorescein uptake on bulbar conjunctiva OD>OS   - Low suspicion for SJS from ophtho stand point  - Recommend erythromycin ointment QID to both eyes (directly into the eyes)  - Appreciate derm consult  - Rest of work-up and plan per primary team  - Findings and plan discussed with patient and primary team.    Patient discussed with Dr. Akins    Outpatient follow-up: Patient should follow-up with his/her ophthalmologist or with [facility] Department of Ophthalmology at the address below     [address] [place] [address]. [address]  [place] [address]  [phone]     Assessment and Recommendations:  26y male w/ no significant pmhx/ochx consulted for red eyes. On exam, patient's visual acuity was 20/20 in the right eye, 20/20 in the left eye. There was no APD. IOP were measured to be 16 right eye, 16 left eye. Extraocular movements, confrontational visual fields, and color plates were full in both eyes. Anterior segment exam with penlight revealed fluorescein uptake on bulbar conj nasally, temporally, inferiorly OD, nasally OS; no conj sloughing. Posterior segment exam with 20D lens after dilation revealed no acute abnormalities.   # Presumed RIME  - VA intact; EOMs full;   - 2+ injection OU; fornices swept with no pseudomembranes; no epi defect on corneas; no conj sloughing; no discharge  - Fluorescein uptake on bulbar conjunctiva OD>OS   - Low suspicion for SJS from ophtho stand point  - Recommend erythromycin ointment QID to both eyes (directly into the eyes)  - Appreciate derm consult  - Rest of work-up and plan per primary team  - Findings and plan discussed with patient and primary team.    Patient discussed with Dr. Akins    Outpatient follow-up: Patient should follow-up with his/her ophthalmologist or with [facility] Department of Ophthalmology at the address below     [address] [place] [address]. [address]  [place] [address]  [phone]

## 2022-02-20 NOTE — H&P ADULT - NSICDXFAMHXNEG_GEN_ALL
skin conditions
no abdominal pain, no bloating, no constipation, no diarrhea, no nausea and no vomiting.

## 2022-02-20 NOTE — CONSULT NOTE ADULT - SUBJECTIVE AND OBJECTIVE BOX
[facility] DEPARTMENT OF OPHTHALMOLOGY - INITIAL ADULT CONSULT  -----------------------------------------------------------------------------------------------------------------  Fady Larson MD  PGY 2  [phone]  -----------------------------------------------------------------------------------------------------------------    HPI: 26M with no significant PMH presented to the ED with bilateral eye redness, mouth sores, skin lesions, and penile lesions for 5 days. Reports COVID infection 5 days ago. Reports slight eye pain, denies blurry vision, flashes, floaters, FBS, discharge, double vision, OU. No trouble moving the eyes.    PAST MEDICAL & SURGICAL HISTORY:  No pertinent past medical history    H/O shoulder surgery  Right, x 2 associated with sports injuries    H/O hand surgery  Left pinky      Past Ocular History: none  Ophthalmic Medications: none  FAMILY HISTORY:  No pertinent family history in first degree relatives      Social History: +etoh    MEDICATIONS  (STANDING):  FIRST- Mouthwash  BLM 5 milliLiter(s) Swish and Spit every 4 hours  lactated ringers. 1000 milliLiter(s) (125 mL/Hr) IV Continuous <Continuous>    MEDICATIONS  (PRN):  acetaminophen     Tablet .. 650 milliGRAM(s) Oral every 6 hours PRN Temp greater or equal to 38C (100.4F), Mild Pain (1 - 3)  diphenhydrAMINE 25 milliGRAM(s) Oral every 6 hours PRN Rash and/or Itching  ketorolac   Injectable 30 milliGRAM(s) IV Push every 6 hours PRN Moderate Pain (4 - 6)  melatonin 3 milliGRAM(s) Oral at bedtime PRN Insomnia  morphine  - Injectable 1 milliGRAM(s) IV Push every 4 hours PRN Severe Pain (7 - 10)    Allergies & Intolerances:   amoxicillin (Hives)    Review of Systems:  Constitutional: No fever, chills  Eyes: No blurry vision, flashes, floaters, FBS, discharge, double vision, OU  Neuro: No tremors  Cardiovascular: No chest pain, palpitations  Respiratory: No SOB, no cough  GI: No nausea, vomiting, abdominal pain  : No dysuria  Skin: no rash  Psych: no depression  Endocrine: no polyuria, polydipsia  Heme/lymph: no swelling    VITALS: T(C): 37.7 (02-20-22 @ 00:45)  T(F): 99.9 (02-20-22 @ 00:45), Max: 99.9 (02-20-22 @ 00:45)  HR: 88 (02-20-22 @ 00:45) (88 - 97)  BP: 137/65 (02-20-22 @ 00:45) (137/65 - 149/83)  RR:  (18 - 26)  SpO2:  (97% - 99%)  Wt(kg): --  General: AAO x 3, appropriate mood and affect    Ophthalmology Exam:  Visual acuity (sc): 20/20 OU  Pupils: PERRL OU, no APD  Ttono: 16 OU  Extraocular movements (EOMs): Full OU, no pain, no diplopia  Confrontational Visual Field (CVF): Full OU  Color Plates: 12/12 OU    Pen Light Exam (PLE)  External: Flat OU  Lids/Lashes/Lacrimal Ducts: Flat OU; fornices swept with no pseudomembranes  Sclera/Conjunctiva: W+Q OU, fluorescein uptake on bulbar conj nasally, temporally, inferiorly OD, nasally OS; no conj sloughing; no discharge  Cornea: Cl OU, no epi defect  Anterior Chamber: D+F OU    Iris: Flat OU  Lens: Cl OU    Fundus Exam: dilated with 1% tropicamide and 2.5% phenylephrine  Approval obtained from primary team for dilation  Patient aware that pupils can remained dilated for at least 4-6 hours  Exam performed with 20D lens    Vitreous: wnl OU  Disc, cup/disc: sharp and pink, 0.4 OU  Macula: wnl OU  Vessels: wnl OU  Periphery: wnl OU   [facility] DEPARTMENT OF OPHTHALMOLOGY - INITIAL ADULT CONSULT  -----------------------------------------------------------------------------------------------------------------  Fady Larson MD  PGY 2  [phone]  -----------------------------------------------------------------------------------------------------------------    HPI: 26M with no significant PMH presented to the ED with bilateral eye redness, mouth sores, skin lesions, and penile lesions for 5 days. Reports COVID infection 5 days ago. Reports slight eye pain, denies blurry vision, flashes, floaters, FBS, discharge, double vision, OU. No trouble moving the eyes. Reports taking 1 day of clindamycin and 2 days of Valtrex    PAST MEDICAL & SURGICAL HISTORY:  No pertinent past medical history    H/O shoulder surgery  Right, x 2 associated with sports injuries    H/O hand surgery  Left pinky      Past Ocular History: none  Ophthalmic Medications: none  FAMILY HISTORY:  No pertinent family history in first degree relatives      Social History: +etoh    MEDICATIONS  (STANDING):  FIRST- Mouthwash  BLM 5 milliLiter(s) Swish and Spit every 4 hours  lactated ringers. 1000 milliLiter(s) (125 mL/Hr) IV Continuous <Continuous>    MEDICATIONS  (PRN):  acetaminophen     Tablet .. 650 milliGRAM(s) Oral every 6 hours PRN Temp greater or equal to 38C (100.4F), Mild Pain (1 - 3)  diphenhydrAMINE 25 milliGRAM(s) Oral every 6 hours PRN Rash and/or Itching  ketorolac   Injectable 30 milliGRAM(s) IV Push every 6 hours PRN Moderate Pain (4 - 6)  melatonin 3 milliGRAM(s) Oral at bedtime PRN Insomnia  morphine  - Injectable 1 milliGRAM(s) IV Push every 4 hours PRN Severe Pain (7 - 10)    Allergies & Intolerances:   amoxicillin (Hives)    Review of Systems:  Constitutional: No fever, chills  Eyes: No blurry vision, flashes, floaters, FBS, discharge, double vision, OU  Neuro: No tremors  Cardiovascular: No chest pain, palpitations  Respiratory: No SOB, no cough  GI: No nausea, vomiting, abdominal pain  : No dysuria  Skin: no rash  Psych: no depression  Endocrine: no polyuria, polydipsia  Heme/lymph: no swelling    VITALS: T(C): 37.7 (02-20-22 @ 00:45)  T(F): 99.9 (02-20-22 @ 00:45), Max: 99.9 (02-20-22 @ 00:45)  HR: 88 (02-20-22 @ 00:45) (88 - 97)  BP: 137/65 (02-20-22 @ 00:45) (137/65 - 149/83)  RR:  (18 - 26)  SpO2:  (97% - 99%)  Wt(kg): --  General: AAO x 3, appropriate mood and affect    Ophthalmology Exam:  Visual acuity (sc): 20/20 OU  Pupils: PERRL OU, no APD  Ttono: 16 OU  Extraocular movements (EOMs): Full OU, no pain, no diplopia  Confrontational Visual Field (CVF): Full OU  Color Plates: 12/12 OU    Pen Light Exam (PLE)  External: Flat OU  Lids/Lashes/Lacrimal Ducts: Flat OU; fornices swept with no pseudomembranes  Sclera/Conjunctiva: W+Q OU, fluorescein uptake on bulbar conj nasally, temporally, inferiorly OD, nasally OS; no conj sloughing; no discharge  Cornea: Cl OU, no epi defect  Anterior Chamber: D+F OU    Iris: Flat OU  Lens: Cl OU    Fundus Exam: dilated with 1% tropicamide and 2.5% phenylephrine  Approval obtained from primary team for dilation  Patient aware that pupils can remained dilated for at least 4-6 hours  Exam performed with 20D lens    Vitreous: wnl OU  Disc, cup/disc: sharp and pink, 0.4 OU  Macula: wnl OU  Vessels: wnl OU  Periphery: wnl OU   [facility] DEPARTMENT OF OPHTHALMOLOGY - INITIAL ADULT CONSULT  -----------------------------------------------------------------------------------------------------------------  Fady Larson MD  PGY 2  [phone]  -----------------------------------------------------------------------------------------------------------------    HPI: 26M with no significant PMH presented to the ED with bilateral eye redness, mouth sores, skin lesions, and penile lesions for 5 days. Reports COVID infection 5 days ago. Reports slight eye pain, denies blurry vision, flashes, floaters, FBS, discharge, double vision, OU. No trouble moving the eyes. Reports taking 1 day of clindamycin and 2 days of Valtrex    PAST MEDICAL & SURGICAL HISTORY:  No pertinent past medical history    H/O shoulder surgery  Right, x 2 associated with sports injuries    H/O hand surgery  Left pinky      Past Ocular History: none  Ophthalmic Medications: none  FAMILY HISTORY:  No pertinent family history in first degree relatives      Social History: +etoh    MEDICATIONS  (STANDING):  FIRST- Mouthwash  BLM 5 milliLiter(s) Swish and Spit every 4 hours  lactated ringers. 1000 milliLiter(s) (125 mL/Hr) IV Continuous <Continuous>    MEDICATIONS  (PRN):  acetaminophen     Tablet .. 650 milliGRAM(s) Oral every 6 hours PRN Temp greater or equal to 38C (100.4F), Mild Pain (1 - 3)  diphenhydrAMINE 25 milliGRAM(s) Oral every 6 hours PRN Rash and/or Itching  ketorolac   Injectable 30 milliGRAM(s) IV Push every 6 hours PRN Moderate Pain (4 - 6)  melatonin 3 milliGRAM(s) Oral at bedtime PRN Insomnia  morphine  - Injectable 1 milliGRAM(s) IV Push every 4 hours PRN Severe Pain (7 - 10)    Allergies & Intolerances:   amoxicillin (Hives)    Review of Systems:  Constitutional: No fever, chills  Eyes: No blurry vision, flashes, floaters, FBS, discharge, double vision, OU  Neuro: No tremors  Cardiovascular: No chest pain, palpitations  Respiratory: No SOB, no cough  GI: No nausea, vomiting, abdominal pain  : No dysuria  Skin: no rash  Psych: no depression  Endocrine: no polyuria, polydipsia  Heme/lymph: no swelling    VITALS: T(C): 37.7 (02-20-22 @ 00:45)  T(F): 99.9 (02-20-22 @ 00:45), Max: 99.9 (02-20-22 @ 00:45)  HR: 88 (02-20-22 @ 00:45) (88 - 97)  BP: 137/65 (02-20-22 @ 00:45) (137/65 - 149/83)  RR:  (18 - 26)  SpO2:  (97% - 99%)  Wt(kg): --  General: AAO x 3, appropriate mood and affect    Ophthalmology Exam:  Visual acuity (sc): 20/20 OU  Pupils: PERRL OU, no APD  Ttono: 16 OU  Extraocular movements (EOMs): Full OU, no pain, no diplopia  Confrontational Visual Field (CVF): Full OU  Color Plates: 12/12 OU    Pen Light Exam (PLE)  External: Flat OU  Lids/Lashes/Lacrimal Ducts: Flat OU; fornices swept with no pseudomembranes  Sclera/Conjunctiva: 2+ injection OU, fluorescein uptake on bulbar conj nasally, temporally, inferiorly OD, nasally OS; no conj sloughing; no discharge  Cornea: Cl OU, no epi defect  Anterior Chamber: D+F OU    Iris: Flat OU  Lens: Cl OU    Fundus Exam: dilated with 1% tropicamide and 2.5% phenylephrine  Approval obtained from primary team for dilation  Patient aware that pupils can remained dilated for at least 4-6 hours  Exam performed with 20D lens    Vitreous: wnl OU  Disc, cup/disc: sharp and pink, 0.4 OU  Macula: wnl OU  Vessels: wnl OU  Periphery: wnl OU

## 2022-02-20 NOTE — CONSULT NOTE ADULT - ASSESSMENT
26M with mucucutaneous rash s/p covid illness. 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     Agree with infectious w/up.  Mild leukocystosis  No fevers    HIV negative  EBV likely past infection    F/up HSV pcr, mycoplasma serology, syphilis serology, GC/chlamydia.    Steroids as per derm  Derm f/up today  Ophthal eval recommended and done. To start ointment for eyes.    Would hold on further antimicrobials at this time.  Do not want to confuse the picture in case rash gets worse before it gets better.  If fever, check blood cultures x 2.  Chest xray clear. Urine benign.    D/w PA, mother and pt.    Stephy Pickett MD  [phone] (pager)  [phone] (office)

## 2022-02-20 NOTE — CONSULT NOTE ADULT - SUBJECTIVE AND OBJECTIVE BOX
Patient is a 26y old  Male who presents with a chief complaint of   HPI:  This is a 25 yo M h/o prior oral chlamydia 5 years ago presents today for rash s/p covid-19 infection.  Pt has been staying in [place].  On 2/8 tested positive for covid.  Has had 2 vaccine doses, last dose 8/2021.  Did not get booster yet.  On 2/14 started to develop oral lesions, spread to penis and worsened.  Target like lesions on arms and legs also.    Given z-pack initially when feeling unwell with covid.  When rash appeared, went to urgent care.  Given valtrex and doxy. Took 1 dose and stopped b/c worsened.  Also given medrol dose pack.  Deemed not contagious anymore and flew back to [place] and came right to hospital.    Pt states he feels miserable. Mouth is extremely painful. Can't eat or drink. Eyes are uncomfortable but no pain.   Penis with rash and painful.  Derm eval pending but brief consult concerned for RIME from post-infectious covid-19 infection.    Given steroids overnight.  No fevers      PAST MEDICAL & SURGICAL HISTORY:  No pertinent past medical history    H/O shoulder surgery  Right, x 2 associated with sports injuries    H/O hand surgery  Left pinky        Social history: no smoking or drugs. Occ alcohol. Produces videos [employer]    FAMILY HISTORY:  No pertinent family history in first degree relatives      Allergies    amoxicillin (Hives)  cephalosporins (Hives)  cephalosporins (Unknown)    Intolerances        Antimicrobials:    MEDICATIONS  (STANDING):  acetaminophen     Tablet .. 650 every 6 hours PRN  diphenhydrAMINE 25 every 6 hours PRN  enoxaparin Injectable 40 daily  ketorolac   Injectable 15 every 6 hours  melatonin 3 at bedtime PRN  methylPREDNISolone sodium succinate Injectable 65 daily  morphine  - Injectable 2 every 4 hours PRN  morphine  - Injectable 1 every 4 hours PRN  senna 2 at bedtime          Vital Signs Last 24 Hrs  T(C): 37 (20 Feb 2022 11:51), Max: 37.7 (20 Feb 2022 00:45)  T(F): 98.6 (20 Feb 2022 11:51), Max: 99.9 (20 Feb 2022 00:45)  HR: 66 (20 Feb 2022 11:51) (66 - 97)  BP: 131/74 (20 Feb 2022 11:51) (131/74 - 149/83)  BP(mean): --  RR: 18 (20 Feb 2022 11:51) (18 - 26)  SpO2: 98% (20 Feb 2022 11:51) (97% - 99%)    Gen: Appears unwell  HEENT: EOMI, PERRL, Conjunctivae injected  Oral lesions noted on lips and tongue, like mucositis  Neck: Supple  CV: S1S2 RRR  Resp: CTA b/l  Abd: +bs, soft  Ext: No edema  Skin: Target like lesions on arms, legs. Small - most about 1 cm in size or smaller  Penis: skin desquamation noted                            15.3   11.81 )-----------( 330      ( 19 Feb 2022 20:18 )             45.6         02-20    138  |  100  |  11  ----------------------------<  112<H>  4.5   |  24  |  0.90    Ca    9.9      20 Feb 2022 07:03  Phos  3.7     02-20  Mg     2.3     02-20    TPro  8.6<H>  /  Alb  5.2<H>  /  TBili  0.6  /  DBili  x   /  AST  8<L>  /  ALT  9<L>  /  AlkPhos  59  02-19      RECENT CULTURES:      MICROBIOLOGY:  HIV negative  RVP negative    uUrine Microscopic-Add On (NC) (02.19.22 @ 20:57)   Bacteria: Negative   Epithelial Cells: 2 /hpf   Red Blood Cell - Urine: 0 /hpf   White Blood Cell - Urine: 4 /HPF   Hyaline Casts: 19 /lpf Urinalysis (02.19.22 @ 20:57)   pH Urine: 6.0   Glucose Qualitative, Urine: Negative   Blood, Urine: Negative   Color: Yellow   Urine Appearance: Clear   Bilirubin: Negative   Ketone - Urine: Moderate   Specific Gravity: 1.032   Protein, Urine: 30 mg/dL   Urobilinogen: Negative   Nitrite: Negative   Leukocyte Esterase Concentration: Negative Nabil-Barr Virus Serologic Test (02.20.22 @ 00:15)   Nabil-Barr Virus Capsid Antigen IgG: Positive: Rapid HIV-1/2 Antibody (02.19.22 @ 20:18)   Rapid HIV-1/2 Antibody: Nonreact:         Radiology:  < from: Xray Chest 2 Views PA/Lat (02.19.22 @ 20:32) >  IMPRESSION:    Clear lungs.    < end of copied text >

## 2022-02-21 LAB
ALBUMIN SERPL ELPH-MCNC: 4 G/DL — SIGNIFICANT CHANGE UP (ref 3.3–5)
ALP SERPL-CCNC: 49 U/L — SIGNIFICANT CHANGE UP (ref 40–120)
ALT FLD-CCNC: 7 U/L — LOW (ref 10–45)
ANION GAP SERPL CALC-SCNC: 10 MMOL/L — SIGNIFICANT CHANGE UP (ref 5–17)
AST SERPL-CCNC: 8 U/L — LOW (ref 10–40)
BILIRUB SERPL-MCNC: 0.5 MG/DL — SIGNIFICANT CHANGE UP (ref 0.2–1.2)
BUN SERPL-MCNC: 9 MG/DL — SIGNIFICANT CHANGE UP (ref 7–23)
C TRACH RRNA SPEC QL NAA+PROBE: SIGNIFICANT CHANGE UP
CALCIUM SERPL-MCNC: 9.6 MG/DL — SIGNIFICANT CHANGE UP (ref 8.4–10.5)
CHLORIDE SERPL-SCNC: 102 MMOL/L — SIGNIFICANT CHANGE UP (ref 96–108)
CO2 SERPL-SCNC: 26 MMOL/L — SIGNIFICANT CHANGE UP (ref 22–31)
CREAT SERPL-MCNC: 0.85 MG/DL — SIGNIFICANT CHANGE UP (ref 0.5–1.3)
GLUCOSE SERPL-MCNC: 89 MG/DL — SIGNIFICANT CHANGE UP (ref 70–99)
HCT VFR BLD CALC: 38.3 % — LOW (ref 39–50)
HGB BLD-MCNC: 13.5 G/DL — SIGNIFICANT CHANGE UP (ref 13–17)
LACTATE SERPL-SCNC: 0.9 MMOL/L — SIGNIFICANT CHANGE UP (ref 0.7–2)
M PNEUMO IGG SER IA-ACNC: 3.35 INDEX — HIGH (ref 0–0.9)
M PNEUMO IGG SER IA-ACNC: POSITIVE
M PNEUMO IGM SER-ACNC: 1.33 INDEX — HIGH (ref 0–0.9)
MCHC RBC-ENTMCNC: 31.8 PG — SIGNIFICANT CHANGE UP (ref 27–34)
MCHC RBC-ENTMCNC: 35.2 GM/DL — SIGNIFICANT CHANGE UP (ref 32–36)
MCV RBC AUTO: 90.3 FL — SIGNIFICANT CHANGE UP (ref 80–100)
MYCOPLASMA AG SPEC QL: POSITIVE
N GONORRHOEA RRNA SPEC QL NAA+PROBE: SIGNIFICANT CHANGE UP
NRBC # BLD: 0 /100 WBCS — SIGNIFICANT CHANGE UP (ref 0–0)
PLATELET # BLD AUTO: 299 K/UL — SIGNIFICANT CHANGE UP (ref 150–400)
POTASSIUM SERPL-MCNC: 4.1 MMOL/L — SIGNIFICANT CHANGE UP (ref 3.5–5.3)
POTASSIUM SERPL-SCNC: 4.1 MMOL/L — SIGNIFICANT CHANGE UP (ref 3.5–5.3)
PROT SERPL-MCNC: 6.8 G/DL — SIGNIFICANT CHANGE UP (ref 6–8.3)
RBC # BLD: 4.24 M/UL — SIGNIFICANT CHANGE UP (ref 4.2–5.8)
RBC # FLD: 11.9 % — SIGNIFICANT CHANGE UP (ref 10.3–14.5)
SODIUM SERPL-SCNC: 138 MMOL/L — SIGNIFICANT CHANGE UP (ref 135–145)
SPECIMEN SOURCE: SIGNIFICANT CHANGE UP
T PALLIDUM AB TITR SER: NEGATIVE — SIGNIFICANT CHANGE UP
WBC # BLD: 9.83 K/UL — SIGNIFICANT CHANGE UP (ref 3.8–10.5)
WBC # FLD AUTO: 9.83 K/UL — SIGNIFICANT CHANGE UP (ref 3.8–10.5)

## 2022-02-21 PROCEDURE — 99232 SBSQ HOSP IP/OBS MODERATE 35: CPT

## 2022-02-21 PROCEDURE — 99231 SBSQ HOSP IP/OBS SF/LOW 25: CPT | Mod: GC

## 2022-02-21 RX ORDER — OFLOXACIN 0.3 %
1 DROPS OPHTHALMIC (EYE)
Refills: 0 | Status: DISCONTINUED | OUTPATIENT
Start: 2022-02-21 | End: 2022-02-25

## 2022-02-21 RX ORDER — DIPHENHYDRAMINE HYDROCHLORIDE AND LIDOCAINE HYDROCHLORIDE AND ALUMINUM HYDROXIDE AND MAGNESIUM HYDRO
10 KIT EVERY 4 HOURS
Refills: 0 | Status: COMPLETED | OUTPATIENT
Start: 2022-02-21 | End: 2022-02-23

## 2022-02-21 RX ORDER — ERYTHROMYCIN BASE 5 MG/GRAM
1 OINTMENT (GRAM) OPHTHALMIC (EYE)
Refills: 0 | Status: DISCONTINUED | OUTPATIENT
Start: 2022-02-21 | End: 2022-02-25

## 2022-02-21 RX ADMIN — Medication 15 MILLIGRAM(S): at 18:48

## 2022-02-21 RX ADMIN — LIDOCAINE 10 MILLILITER(S): 4 CREAM TOPICAL at 22:07

## 2022-02-21 RX ADMIN — Medication 15 MILLIGRAM(S): at 11:52

## 2022-02-21 RX ADMIN — MORPHINE SULFATE 1 MILLIGRAM(S): 50 CAPSULE, EXTENDED RELEASE ORAL at 01:37

## 2022-02-21 RX ADMIN — SODIUM CHLORIDE 125 MILLILITER(S): 9 INJECTION, SOLUTION INTRAVENOUS at 05:13

## 2022-02-21 RX ADMIN — SENNA PLUS 2 TABLET(S): 8.6 TABLET ORAL at 22:07

## 2022-02-21 RX ADMIN — ENOXAPARIN SODIUM 40 MILLIGRAM(S): 100 INJECTION SUBCUTANEOUS at 11:48

## 2022-02-21 RX ADMIN — LIDOCAINE 10 MILLILITER(S): 4 CREAM TOPICAL at 01:07

## 2022-02-21 RX ADMIN — Medication 15 MILLIGRAM(S): at 06:35

## 2022-02-21 RX ADMIN — DIPHENHYDRAMINE HYDROCHLORIDE AND LIDOCAINE HYDROCHLORIDE AND ALUMINUM HYDROXIDE AND MAGNESIUM HYDRO 10 MILLILITER(S): KIT at 22:07

## 2022-02-21 RX ADMIN — Medication 1 APPLICATION(S): at 11:48

## 2022-02-21 RX ADMIN — Medication 15 MILLIGRAM(S): at 06:38

## 2022-02-21 RX ADMIN — SODIUM CHLORIDE 125 MILLILITER(S): 9 INJECTION, SOLUTION INTRAVENOUS at 14:22

## 2022-02-21 RX ADMIN — SODIUM CHLORIDE 125 MILLILITER(S): 9 INJECTION, SOLUTION INTRAVENOUS at 22:11

## 2022-02-21 RX ADMIN — Medication 1 DROP(S): at 18:36

## 2022-02-21 RX ADMIN — Medication 1 APPLICATION(S): at 05:13

## 2022-02-21 RX ADMIN — Medication 15 MILLIGRAM(S): at 12:15

## 2022-02-21 RX ADMIN — MORPHINE SULFATE 2 MILLIGRAM(S): 50 CAPSULE, EXTENDED RELEASE ORAL at 09:33

## 2022-02-21 RX ADMIN — DIPHENHYDRAMINE HYDROCHLORIDE AND LIDOCAINE HYDROCHLORIDE AND ALUMINUM HYDROXIDE AND MAGNESIUM HYDRO 5 MILLILITER(S): KIT at 09:34

## 2022-02-21 RX ADMIN — Medication 100 MILLIGRAM(S): at 05:07

## 2022-02-21 RX ADMIN — MORPHINE SULFATE 1 MILLIGRAM(S): 50 CAPSULE, EXTENDED RELEASE ORAL at 05:37

## 2022-02-21 RX ADMIN — Medication 1 APPLICATION(S): at 00:02

## 2022-02-21 RX ADMIN — DIPHENHYDRAMINE HYDROCHLORIDE AND LIDOCAINE HYDROCHLORIDE AND ALUMINUM HYDROXIDE AND MAGNESIUM HYDRO 5 MILLILITER(S): KIT at 18:38

## 2022-02-21 RX ADMIN — MORPHINE SULFATE 2 MILLIGRAM(S): 50 CAPSULE, EXTENDED RELEASE ORAL at 14:50

## 2022-02-21 RX ADMIN — MORPHINE SULFATE 1 MILLIGRAM(S): 50 CAPSULE, EXTENDED RELEASE ORAL at 05:07

## 2022-02-21 RX ADMIN — MORPHINE SULFATE 1 MILLIGRAM(S): 50 CAPSULE, EXTENDED RELEASE ORAL at 01:07

## 2022-02-21 RX ADMIN — Medication 1 APPLICATION(S): at 18:36

## 2022-02-21 RX ADMIN — LIDOCAINE 10 MILLILITER(S): 4 CREAM TOPICAL at 18:36

## 2022-02-21 RX ADMIN — MORPHINE SULFATE 2 MILLIGRAM(S): 50 CAPSULE, EXTENDED RELEASE ORAL at 14:22

## 2022-02-21 RX ADMIN — Medication 15 MILLIGRAM(S): at 18:38

## 2022-02-21 RX ADMIN — LIDOCAINE 10 MILLILITER(S): 4 CREAM TOPICAL at 14:13

## 2022-02-21 RX ADMIN — DIPHENHYDRAMINE HYDROCHLORIDE AND LIDOCAINE HYDROCHLORIDE AND ALUMINUM HYDROXIDE AND MAGNESIUM HYDRO 5 MILLILITER(S): KIT at 14:12

## 2022-02-21 RX ADMIN — MORPHINE SULFATE 2 MILLIGRAM(S): 50 CAPSULE, EXTENDED RELEASE ORAL at 10:00

## 2022-02-21 RX ADMIN — MORPHINE SULFATE 2 MILLIGRAM(S): 50 CAPSULE, EXTENDED RELEASE ORAL at 19:50

## 2022-02-21 RX ADMIN — LIDOCAINE 10 MILLILITER(S): 4 CREAM TOPICAL at 09:34

## 2022-02-21 RX ADMIN — DIPHENHYDRAMINE HYDROCHLORIDE AND LIDOCAINE HYDROCHLORIDE AND ALUMINUM HYDROXIDE AND MAGNESIUM HYDRO 5 MILLILITER(S): KIT at 01:07

## 2022-02-21 RX ADMIN — LIDOCAINE 10 MILLILITER(S): 4 CREAM TOPICAL at 05:07

## 2022-02-21 RX ADMIN — DIPHENHYDRAMINE HYDROCHLORIDE AND LIDOCAINE HYDROCHLORIDE AND ALUMINUM HYDROXIDE AND MAGNESIUM HYDRO 5 MILLILITER(S): KIT at 05:06

## 2022-02-21 RX ADMIN — MORPHINE SULFATE 2 MILLIGRAM(S): 50 CAPSULE, EXTENDED RELEASE ORAL at 20:20

## 2022-02-21 NOTE — PROGRESS NOTE ADULT - ASSESSMENT
Favor RIME (reactive infectious mucocutaneous eruption) 2/2 recent COVID infection given predominant mucosal > cutaneous involvement and with patient’s reported history of similar type of presentation with chlamydia pneumonia in the past. RIME has predominant mucosal involvement and minor skin manifestations and the latency times between symptoms of COVID-19 infection and mucosal eruptions range from 4 days to 12 weeks, with oral lesions appearing 4 to 7 days after systemic symptoms and resolving in 5 to 21 days. Differential also includes mucosal-dominant erythema multiforme. SJS much less likely given not classic cutaneous morphology (no dusky lesions, full thickness epidermal necrosis or sloughing), absence of skin pain and no likely antecedent medication culprit.      At this time:   - Recommend 100 mg IV solumedrol tomorrow  - Dermatology will continue to follow and adjust steroid dosing going forward   - Recommend continuing magic mouthwash. Can increase frequency if ok per primary team   - From dermatologic perspective, ok to shower     The patient's chart was reviewed in addition to being seen and examined at bedside with the dermatology attending Dr. Ness. Recommendations were communicated with the primary team.    Please page [phone] w/10 digit call back number for further related questions.    Meche Holden MD  Resident Physician, PGY2  [facility] Dermatology  Pager: [phone]  Office: [phone]

## 2022-02-21 NOTE — PROGRESS NOTE ADULT - SUBJECTIVE AND OBJECTIVE BOX
[facility] DEPARTMENT OF OPHTHALMOLOGY  ------------------------------------------------------------------------------  Jorge Alberto Paniagua MD PGY-2  Pager: [phone], also available on teams  ------------------------------------------------------------------------------    Interval History: No acute events overnight. Today patient denying blurred vision, eye pain, flashes, floaters, FBs or discharge.   Mother at bedside reporting improvement in eye redness.    MEDICATIONS  (STANDING):  enoxaparin Injectable 40 milliGRAM(s) SubCutaneous daily  erythromycin   Ointment 1 Application(s) Both EYES four times a day  FIRST- Mouthwash  BLM 5 milliLiter(s) Swish and Spit every 4 hours  ketorolac   Injectable 15 milliGRAM(s) IV Push every 6 hours  lactated ringers. 1000 milliLiter(s) (125 mL/Hr) IV Continuous <Continuous>  lidocaine 2% Viscous 10 milliLiter(s) Swish and Spit every 4 hours  senna 2 Tablet(s) Oral at bedtime    MEDICATIONS  (PRN):  acetaminophen     Tablet .. 650 milliGRAM(s) Oral every 6 hours PRN Temp greater or equal to 38C (100.4F), Mild Pain (1 - 3)  artificial tears (preservative free) Ophthalmic Solution 1 Drop(s) Both EYES every 1 hour PRN Dry Eyes  diphenhydrAMINE 25 milliGRAM(s) Oral every 6 hours PRN Rash and/or Itching  melatonin 3 milliGRAM(s) Oral at bedtime PRN Insomnia  morphine  - Injectable 2 milliGRAM(s) IV Push every 4 hours PRN Severe Pain (7 - 10)  morphine  - Injectable 1 milliGRAM(s) IV Push every 4 hours PRN Moderate Pain (4 - 6)      VITALS: T(C): 36.9 (02-21-22 @ 11:31)  T(F): 98.5 (02-21-22 @ 11:31), Max: 98.5 (02-21-22 @ 04:41)  HR: 77 (02-21-22 @ 11:31) (70 - 77)  BP: 137/78 (02-21-22 @ 11:31) (133/72 - 148/88)  RR:  (18 - 18)  SpO2:  (97% - 97%)  General: AAO x 3, appropriate mood and affect    Ophthalmology Exam:  Visual acuity (sc): 20/20 OD 20/20 OS  Pupils: PERRL OU, no APD  Ttono: IOP OD 14 OS 11  Extraocular movements (EOMs): Full OU, no pain, no diplopia  Confrontational Visual Field (CVF): Full OU  Color Plates: 12/12 OU    Pen Light Exam (PLE)  External: Flat OU  Lids/Lashes/Lacrimal Ducts: Flat OU; fornices swept with no pseudomembranes  Sclera/Conjunctiva: 2+ injection OU, fluorescein uptake on bulbar conj nasally, temporally, inferiorly OD, nasally OS; no conj sloughing; no discharge  Cornea: Cl OU, no epi defect  Anterior Chamber: D+F OU    Iris: Flat OU  Lens: Cl OU

## 2022-02-21 NOTE — PROGRESS NOTE ADULT - SUBJECTIVE AND OBJECTIVE BOX
SUBJECTIVE / OVERNIGHT EVENTS:c/o oral pain  02-21-22    MEDICATIONS  (STANDING):  enoxaparin Injectable 40 milliGRAM(s) SubCutaneous daily  erythromycin   Ointment 1 Application(s) Both EYES two times a day  FIRST- Mouthwash  BLM 10 milliLiter(s) Swish and Spit every 4 hours  ketorolac   Injectable 15 milliGRAM(s) IV Push every 6 hours  lactated ringers. 1000 milliLiter(s) (125 mL/Hr) IV Continuous <Continuous>  lidocaine 2% Viscous 10 milliLiter(s) Swish and Spit every 4 hours  ofloxacin 0.3% Solution 1 Drop(s) Both EYES four times a day  senna 2 Tablet(s) Oral at bedtime    MEDICATIONS  (PRN):  acetaminophen     Tablet .. 650 milliGRAM(s) Oral every 6 hours PRN Temp greater or equal to 38C (100.4F), Mild Pain (1 - 3)  artificial tears (preservative free) Ophthalmic Solution 1 Drop(s) Both EYES every 1 hour PRN Dry Eyes  diphenhydrAMINE 25 milliGRAM(s) Oral every 6 hours PRN Rash and/or Itching  melatonin 3 milliGRAM(s) Oral at bedtime PRN Insomnia  morphine  - Injectable 2 milliGRAM(s) IV Push every 4 hours PRN Severe Pain (7 - 10)  morphine  - Injectable 1 milliGRAM(s) IV Push every 4 hours PRN Moderate Pain (4 - 6)    T(C): 37.1 (02-21-22 @ 21:15), Max: 37.1 (02-21-22 @ 21:15)  HR: 70 (02-21-22 @ 21:15) (70 - 77)  BP: 129/79 (02-21-22 @ 21:15) (129/79 - 148/88)  RR: 18 (02-21-22 @ 21:15) (18 - 18)  SpO2: 97% (02-21-22 @ 21:15) (97% - 97%)    CAPILLARY BLOOD GLUCOSE        I&O's Summary    20 Feb 2022 07:01  -  21 Feb 2022 07:00  --------------------------------------------------------  IN: 2460 mL / OUT: 0 mL / NET: 2460 mL    21 Feb 2022 07:01  -  21 Feb 2022 22:41  --------------------------------------------------------  IN: 360 mL / OUT: 0 mL / NET: 360 mL        Constitutional: No fever, fatigue  Skin: No rash.  Eyes: No recent vision problems or eye pain.  ENT: No congestion, ear pain, or sore throat.  Cardiovascular: No chest pain or palpation.  Respiratory: No cough, shortness of breath, congestion, or wheezing.  Gastrointestinal: No abdominal pain, nausea, vomiting, or diarrhea.  Genitourinary: No dysuria.  Musculoskeletal: No joint swelling.  Neurologic: No headache.    PHYSICAL EXAM:  GENERAL: NAD  EYES: EOMI, PERRLA, erythematous sclera+  oral lesions+  NECK: Supple, No JVD  CHEST/LUNG: cta chacorta  HEART:  S1 , S2 +  ABDOMEN: soft , bs+  EXTREMITIES:  no edema  NEUROLOGY:alert awake oriented      LABS:                        13.5   9.83  )-----------( 299      ( 21 Feb 2022 06:44 )             38.3     02-21    138  |  102  |  9   ----------------------------<  89  4.1   |  26  |  0.85    Ca    9.6      21 Feb 2022 06:42  Phos  3.7     02-20  Mg     2.3     02-20    TPro  6.8  /  Alb  4.0  /  TBili  0.5  /  DBili  x   /  AST  8<L>  /  ALT  7<L>  /  AlkPhos  49  02-21              RADIOLOGY & ADDITIONAL TESTS:    Imaging Personally Reviewed:    Consultant(s) Notes Reviewed:      Care Discussed with Consultants/Other Providers:

## 2022-02-21 NOTE — PROGRESS NOTE ADULT - ATTENDING COMMENTS
Patient stable - few more lesions on dorsal hands today, doesn't feel much better yet, denies skin pain, eye pain, +itch of eyes, pain with swallowing, pain with urination but no pain at baseline on glans penis  Exam with conjunctival injection, erosions on vermillion lips, hard and soft palate, buccal mucosa, dorsal tongue, glans penis   Papular targetoid lesions on dorsal hands and scattered on extremities, sparing trunk and face     HSV PCR -, Mycoplasma IgM/IgG +, COVID PCR + 2/8/2022 per patient    Favor RIME (reactive infectious mucocutaneous eruption) 2/2 recent COVID infection vs MIRM (Mycoplasma induced rash and mucositis) given positive serologies (CXR on admisson clear and pt s/p azithromycin course prior to admission).  RIME and MIRM (both cutaneous hypersensitivty reactions) have predominant mucosal involvement and minor skin manifestations and in the case of RIME, latency times between symptoms of COVID-19 infection and mucosal eruptions range from 4 days to 12 weeks, with oral lesions appearing 4 to 7 days after systemic symptoms and resolving in 5 to 21 days. Diagnosis is not c/w SJS (no dusky lesions, full thickness epidermal necrosis or sloughing) and absence of skin pain and no likely antecedent medication culprit.      Plan as above.

## 2022-02-21 NOTE — PROGRESS NOTE ADULT - ASSESSMENT
26M with mucucutaneous rash s/p covid illness. 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     Agree with infectious w/up.  Mild leukocystosis, resolved  No fevers    HIV negative  EBV likely past infection  HSV negative, syphilis negative, mycoplasma likely indicative of past infection. Treated with azithro alreay    F/up GC/chlamydia.    Steroids as per derm  Derm f/up today  Ophthal eval recommended and done. To start ointment for eyes.    Seems slightly better today.    Will continue to monitor    Would hold on further antimicrobials at this time.  Do not want to confuse the picture in case rash gets worse before it gets better.  If fever, check blood cultures x 2.  Chest xray clear. Urine benign.    D/w  mother and pt and also derm resident today    Stephy Pickett MD  [phone] (pager)  [phone] (office)

## 2022-02-21 NOTE — PROGRESS NOTE ADULT - SUBJECTIVE AND OBJECTIVE BOX
INTERVAL HPI/OVERNIGHT EVENTS:   - Mycoplasma IgM, IgG pos.   - Given the 100 mg solumedrol today.   - Patient feels about the same. Has developed newer lesions around the fingernails that are mainly asymptomatic. Still feels the magic mouthwash is the most effective and helps the most    MEDICATIONS  (STANDING):  enoxaparin Injectable 40 milliGRAM(s) SubCutaneous daily  erythromycin   Ointment 1 Application(s) Both EYES four times a day  FIRST- Mouthwash  BLM 5 milliLiter(s) Swish and Spit every 4 hours  ketorolac   Injectable 15 milliGRAM(s) IV Push every 6 hours  lactated ringers. 1000 milliLiter(s) (125 mL/Hr) IV Continuous <Continuous>  lidocaine 2% Viscous 10 milliLiter(s) Swish and Spit every 4 hours  senna 2 Tablet(s) Oral at bedtime    MEDICATIONS  (PRN):  acetaminophen     Tablet .. 650 milliGRAM(s) Oral every 6 hours PRN Temp greater or equal to 38C (100.4F), Mild Pain (1 - 3)  artificial tears (preservative free) Ophthalmic Solution 1 Drop(s) Both EYES every 1 hour PRN Dry Eyes  diphenhydrAMINE 25 milliGRAM(s) Oral every 6 hours PRN Rash and/or Itching  melatonin 3 milliGRAM(s) Oral at bedtime PRN Insomnia  morphine  - Injectable 2 milliGRAM(s) IV Push every 4 hours PRN Severe Pain (7 - 10)  morphine  - Injectable 1 milliGRAM(s) IV Push every 4 hours PRN Moderate Pain (4 - 6)      Allergies    amoxicillin (Hives)  cephalosporins (Hives)  cephalosporins (Unknown)    Intolerances        REVIEW OF SYSTEMS    General: no fevers/chills, no lethargy  	  Skin/Breast: see HPI	    Ophthalmologic: minimal eye pruritus 	    ENMT: +dysphagia     Respiratory and Thorax: no SOB or cough    Cardiovascular: no palpitations or chest pain    Gastrointestinal: no abdominal pain or blood in stool     Genitourinary: +dysuria     Musculoskeletal: no joint pains or weakness	    Neurological: no weakness, numbness, or tingling      Vital Signs Last 24 Hrs  T(C): 36.9 (2022 11:31), Max: 36.9 (2022 21:46)  T(F): 98.5 (2022 11:31), Max: 98.5 (2022 04:41)  HR: 77 (2022 11:31) (70 - 77)  BP: 137/78 (2022 11:31) (133/72 - 148/88)  BP(mean): --  RR: 18 (2022 11:31) (18 - 18)  SpO2: 97% (2022 11:31) (97% - 97%)      PHYSICAL EXAM:    General: Well appearing, well nourished, in no apparent distress  HEENT: Normocephalic, thyroid not visibly enlarged, conjunctiva not injected, oropharynx clear without ulcerations  CV: No lower extremity edema, extremities warm and well perfused, +dorsalis pedis pulses  Resp: Non labored breathing, no clubbing of extremities  GI: Non distended abdomen, no palpable hepatosplenomegaly  Lymph: No visible lymphadenopathy  Neuro: Alert and oriented x3  Skin: The scalp/hair, head/face, conjunctivae/lids, lips/teeth/gums, neck, digits/nails, right and left axilla, right and left upper and lower extremities, chest, abdomen, back, buttocks and groin area. No bromhidrosis or hyperhidrosis.    Of note on skin exam:    erosions of the vermillion lips with serous exudate, erosions of the hard and soft palate, white plaque of the tongue and erosions the lateral aspects of the tongue, gray-white plaque with bulla of the glans of the penis, scattered papular targetoid lesions with two zones of color of the trunk and extremities, edema of the lower eyelids with redness of conjunctiva bilaterally - all improved compared to prior     LABS:                        13.5   9.83  )-----------( 299      ( 2022 06:44 )             38.3     02-21    138  |  102  |  9   ----------------------------<  89  4.1   |  26  |  0.85    Ca    9.6      2022 06:42  Phos  3.7     02-20  Mg     2.3     02-20    TPro  6.8  /  Alb  4.0  /  TBili  0.5  /  DBili  x   /  AST  8<L>  /  ALT  7<L>  /  AlkPhos  49  02-21      Urinalysis Basic - ( 2022 20:57 )    Color: Yellow / Appearance: Clear / S.032 / pH: x  Gluc: x / Ketone: Moderate  / Bili: Negative / Urobili: Negative   Blood: x / Protein: 30 mg/dL / Nitrite: Negative   Leuk Esterase: Negative / RBC: 0 /hpf / WBC 4 /HPF   Sq Epi: x / Non Sq Epi: 2 /hpf / Bacteria: Negative        RADIOLOGY & ADDITIONAL TESTS:    MICRO:   T pallidum neg   HIV neg   Mycoplasma IgM, IgG pos   COVID neg   HSV PCR neg

## 2022-02-21 NOTE — PROGRESS NOTE ADULT - ASSESSMENT
26y male w/ no significant pmhx/ochx consulted for red eyes. On exam, patient's visual acuity was 20/20 in the right eye, 20/20 in the left eye. There was no APD. IOP were measured to be 16 right eye, 16 left eye. Extraocular movements, confrontational visual fields, and color plates were full in both eyes. Anterior segment exam with penlight revealed fluorescein uptake on bulbar conj nasally, temporally, inferiorly OD, nasally OS; no conj sloughing. Posterior segment exam with 20D lens after dilation revealed no acute abnormalities.     # Presumed RIME  - VA intact; EOMs full; 1-2+ injection OU; fornices swept again today with no pseudomembranes; no epi defect on corneas; no conj sloughing; no discharge  - Fluorescein uptake on bulbar conjunctiva OD>OS - stable from yesterday  - Low suspicion for SJS from ophtho stand point  - Continue erythromycin ointment QID to both eyes (directly into the eyes)  - Appreciate derm input  - Rest of work-up and plan per primary team  - Findings and plan discussed with patient and primary team.    Patient discussed with Dr. Akins    Outpatient follow-up: Patient should follow-up with his/her ophthalmologist or with [facility] Department of Ophthalmology at the address below     [address] [place] [address]. [address]  [place] [address]  [phone]     26y male w/ no significant pmhx/ochx consulted for red eyes. On exam, patient's visual acuity was 20/20 in the right eye, 20/20 in the left eye. There was no APD. IOP were measured to be 16 right eye, 16 left eye. Extraocular movements, confrontational visual fields, and color plates were full in both eyes. Anterior segment exam with penlight revealed fluorescein uptake on bulbar conj nasally, temporally, inferiorly OD, nasally OS; no conj sloughing. Posterior segment exam with 20D lens after dilation revealed no acute abnormalities.     # Presumed RIME  - VA intact; EOMs full; 1-2+ injection OU; fornices swept again today with no pseudomembranes; no epi defect on corneas; no conj sloughing; no discharge  - Fluorescein uptake on bulbar conjunctiva OD>OS - stable from yesterday  - Low suspicion for SJS from ophtho stand point  - Start Oflox qid to both eyes   - Continue erythromycin ointment, reduce to BID to both eyes (directly into the eyes)  - Appreciate derm input  - Rest of work-up and plan per primary team  - Findings and plan discussed with patient and primary team.    Patient discussed with Dr. Akins    Outpatient follow-up: Patient should follow-up with his/her ophthalmologist or with [facility] Department of Ophthalmology at the address below     [address]  [place] [address]  [phone]

## 2022-02-21 NOTE — PROGRESS NOTE ADULT - SUBJECTIVE AND OBJECTIVE BOX
26yPatient is a 26y old  Male who presents with a chief complaint of rash and pain (20 Feb 2022 18:54)      Interval history:  Swelling slightly decreased of lips and face  Tongue is hurting more today  Apprec derm and opthal eval  HSV negative, HIV negative, Syphilis negative, RVP negative  mycoplasma IgM and IgG positive, likely past infection as Rvp also tests for mycoplasma and was negative  pt already treated with azithromycin last week anyway  gc/chlamydia pending  slight cough    Antimicrobials:  off    MEDICATIONS  (STANDING):  acetaminophen     Tablet .. 650 every 6 hours PRN  diphenhydrAMINE 25 every 6 hours PRN  enoxaparin Injectable 40 daily  ketorolac   Injectable 15 every 6 hours  melatonin 3 at bedtime PRN  morphine  - Injectable 2 every 4 hours PRN  morphine  - Injectable 1 every 4 hours PRN  senna 2 at bedtime        Vital Signs Last 24 Hrs  T(F): 98.5 (02-21-22 @ 11:31), Max: 98.6 (02-20-22 @ 11:51)  HR: 77 (02-21-22 @ 11:31)  BP: 137/78 (02-21-22 @ 11:31)  RR: 18 (02-21-22 @ 11:31)  SpO2: 97% (02-21-22 @ 11:31) (97% - 98%)  Wt(kg): --    Gen: Appears unwell  HEENT: EOMI, PERRL, Conjunctivae injected  Oral lesions noted on lips and tongue, like mucositis  Neck: Supple  CV: S1S2 RRR  Resp: CTA b/l  Abd: +bs, soft  Ext: No edema  Skin: Target like lesions on arms, legs. Small - most about 1 cm in size or smaller. New ones adjacent to nail beds  Penis: skin desquamation noted                            13.5   9.83  )-----------( 299      ( 21 Feb 2022 06:44 )             38.3   02-21    138  |  102  |  9   ----------------------------<  89  4.1   |  26  |  0.85    Ca    9.6      21 Feb 2022 06:42  Phos  3.7     02-20  Mg     2.3     02-20    TPro  6.8  /  Alb  4.0  /  TBili  0.5  /  DBili  x   /  AST  8<L>  /  ALT  7<L>  /  AlkPhos  49  02-21      LIVER FUNCTIONS - ( 21 Feb 2022 06:42 )  Alb: 4.0 g/dL / Pro: 6.8 g/dL / ALK PHOS: 49 U/L / ALT: 7 U/L / AST: 8 U/L / GGT: x             RECENT CULTURES:      Radiology:  < from: Xray Chest 2 Views PA/Lat (02.19.22 @ 20:32) >    The lungs are clear.  No pleural effusion or pneumothorax.  Heart size is within normal limits.  No acute abnormality within visible osseous structures.      IMPRESSION:    Clear lungs.    < end of copied text >

## 2022-02-22 LAB
ANION GAP SERPL CALC-SCNC: 12 MMOL/L — SIGNIFICANT CHANGE UP (ref 5–17)
APTT BLD: 29.4 SEC — SIGNIFICANT CHANGE UP (ref 27.5–35.5)
BASOPHILS # BLD AUTO: 0.02 K/UL — SIGNIFICANT CHANGE UP (ref 0–0.2)
BASOPHILS NFR BLD AUTO: 0.2 % — SIGNIFICANT CHANGE UP (ref 0–2)
BUN SERPL-MCNC: 8 MG/DL — SIGNIFICANT CHANGE UP (ref 7–23)
CALCIUM SERPL-MCNC: 9.5 MG/DL — SIGNIFICANT CHANGE UP (ref 8.4–10.5)
CHLORIDE SERPL-SCNC: 100 MMOL/L — SIGNIFICANT CHANGE UP (ref 96–108)
CO2 SERPL-SCNC: 25 MMOL/L — SIGNIFICANT CHANGE UP (ref 22–31)
CREAT SERPL-MCNC: 0.91 MG/DL — SIGNIFICANT CHANGE UP (ref 0.5–1.3)
EOSINOPHIL # BLD AUTO: 0.02 K/UL — SIGNIFICANT CHANGE UP (ref 0–0.5)
EOSINOPHIL NFR BLD AUTO: 0.2 % — SIGNIFICANT CHANGE UP (ref 0–6)
GLUCOSE SERPL-MCNC: 87 MG/DL — SIGNIFICANT CHANGE UP (ref 70–99)
HCT VFR BLD CALC: 30 % — LOW (ref 39–50)
HCT VFR BLD CALC: 41.5 % — SIGNIFICANT CHANGE UP (ref 39–50)
HGB BLD-MCNC: 10 G/DL — LOW (ref 13–17)
HGB BLD-MCNC: 13.6 G/DL — SIGNIFICANT CHANGE UP (ref 13–17)
IMM GRANULOCYTES NFR BLD AUTO: 0.3 % — SIGNIFICANT CHANGE UP (ref 0–1.5)
LYMPHOCYTES # BLD AUTO: 3.74 K/UL — HIGH (ref 1–3.3)
LYMPHOCYTES # BLD AUTO: 40.9 % — SIGNIFICANT CHANGE UP (ref 13–44)
MCHC RBC-ENTMCNC: 29.2 PG — SIGNIFICANT CHANGE UP (ref 27–34)
MCHC RBC-ENTMCNC: 30.7 PG — SIGNIFICANT CHANGE UP (ref 27–34)
MCHC RBC-ENTMCNC: 32.8 GM/DL — SIGNIFICANT CHANGE UP (ref 32–36)
MCHC RBC-ENTMCNC: 33.3 GM/DL — SIGNIFICANT CHANGE UP (ref 32–36)
MCV RBC AUTO: 89.2 FL — SIGNIFICANT CHANGE UP (ref 80–100)
MCV RBC AUTO: 92 FL — SIGNIFICANT CHANGE UP (ref 80–100)
MONOCYTES # BLD AUTO: 0.7 K/UL — SIGNIFICANT CHANGE UP (ref 0–0.9)
MONOCYTES NFR BLD AUTO: 7.7 % — SIGNIFICANT CHANGE UP (ref 2–14)
NEUTROPHILS # BLD AUTO: 4.64 K/UL — SIGNIFICANT CHANGE UP (ref 1.8–7.4)
NEUTROPHILS NFR BLD AUTO: 50.7 % — SIGNIFICANT CHANGE UP (ref 43–77)
NRBC # BLD: 0 /100 WBCS — SIGNIFICANT CHANGE UP (ref 0–0)
NRBC # BLD: 0 /100 WBCS — SIGNIFICANT CHANGE UP (ref 0–0)
PLATELET # BLD AUTO: 300 K/UL — SIGNIFICANT CHANGE UP (ref 150–400)
PLATELET # BLD AUTO: 321 K/UL — SIGNIFICANT CHANGE UP (ref 150–400)
POTASSIUM SERPL-MCNC: 3.5 MMOL/L — SIGNIFICANT CHANGE UP (ref 3.5–5.3)
POTASSIUM SERPL-SCNC: 3.5 MMOL/L — SIGNIFICANT CHANGE UP (ref 3.5–5.3)
RBC # BLD: 3.26 M/UL — LOW (ref 4.2–5.8)
RBC # BLD: 4.65 M/UL — SIGNIFICANT CHANGE UP (ref 4.2–5.8)
RBC # FLD: 11.9 % — SIGNIFICANT CHANGE UP (ref 10.3–14.5)
RBC # FLD: 14.7 % — HIGH (ref 10.3–14.5)
SODIUM SERPL-SCNC: 137 MMOL/L — SIGNIFICANT CHANGE UP (ref 135–145)
WBC # BLD: 9.15 K/UL — SIGNIFICANT CHANGE UP (ref 3.8–10.5)
WBC # BLD: 9.53 K/UL — SIGNIFICANT CHANGE UP (ref 3.8–10.5)
WBC # FLD AUTO: 9.15 K/UL — SIGNIFICANT CHANGE UP (ref 3.8–10.5)
WBC # FLD AUTO: 9.53 K/UL — SIGNIFICANT CHANGE UP (ref 3.8–10.5)

## 2022-02-22 PROCEDURE — 99232 SBSQ HOSP IP/OBS MODERATE 35: CPT

## 2022-02-22 PROCEDURE — 99447 NTRPROF PH1/NTRNET/EHR 11-20: CPT

## 2022-02-22 RX ADMIN — LIDOCAINE 10 MILLILITER(S): 4 CREAM TOPICAL at 21:19

## 2022-02-22 RX ADMIN — MORPHINE SULFATE 2 MILLIGRAM(S): 50 CAPSULE, EXTENDED RELEASE ORAL at 10:40

## 2022-02-22 RX ADMIN — Medication 1 DROP(S): at 23:55

## 2022-02-22 RX ADMIN — LIDOCAINE 10 MILLILITER(S): 4 CREAM TOPICAL at 10:06

## 2022-02-22 RX ADMIN — LIDOCAINE 10 MILLILITER(S): 4 CREAM TOPICAL at 13:18

## 2022-02-22 RX ADMIN — Medication 15 MILLIGRAM(S): at 06:06

## 2022-02-22 RX ADMIN — SODIUM CHLORIDE 125 MILLILITER(S): 9 INJECTION, SOLUTION INTRAVENOUS at 23:55

## 2022-02-22 RX ADMIN — Medication 15 MILLIGRAM(S): at 05:36

## 2022-02-22 RX ADMIN — LIDOCAINE 10 MILLILITER(S): 4 CREAM TOPICAL at 02:17

## 2022-02-22 RX ADMIN — Medication 1 DROP(S): at 13:19

## 2022-02-22 RX ADMIN — Medication 1 APPLICATION(S): at 17:26

## 2022-02-22 RX ADMIN — MORPHINE SULFATE 2 MILLIGRAM(S): 50 CAPSULE, EXTENDED RELEASE ORAL at 17:10

## 2022-02-22 RX ADMIN — SENNA PLUS 2 TABLET(S): 8.6 TABLET ORAL at 21:20

## 2022-02-22 RX ADMIN — DIPHENHYDRAMINE HYDROCHLORIDE AND LIDOCAINE HYDROCHLORIDE AND ALUMINUM HYDROXIDE AND MAGNESIUM HYDRO 10 MILLILITER(S): KIT at 10:07

## 2022-02-22 RX ADMIN — DIPHENHYDRAMINE HYDROCHLORIDE AND LIDOCAINE HYDROCHLORIDE AND ALUMINUM HYDROXIDE AND MAGNESIUM HYDRO 10 MILLILITER(S): KIT at 02:18

## 2022-02-22 RX ADMIN — Medication 100 MILLIGRAM(S): at 15:41

## 2022-02-22 RX ADMIN — DIPHENHYDRAMINE HYDROCHLORIDE AND LIDOCAINE HYDROCHLORIDE AND ALUMINUM HYDROXIDE AND MAGNESIUM HYDRO 10 MILLILITER(S): KIT at 21:19

## 2022-02-22 RX ADMIN — MORPHINE SULFATE 2 MILLIGRAM(S): 50 CAPSULE, EXTENDED RELEASE ORAL at 21:20

## 2022-02-22 RX ADMIN — SODIUM CHLORIDE 125 MILLILITER(S): 9 INJECTION, SOLUTION INTRAVENOUS at 05:42

## 2022-02-22 RX ADMIN — Medication 1 APPLICATION(S): at 05:35

## 2022-02-22 RX ADMIN — MORPHINE SULFATE 2 MILLIGRAM(S): 50 CAPSULE, EXTENDED RELEASE ORAL at 10:17

## 2022-02-22 RX ADMIN — MORPHINE SULFATE 2 MILLIGRAM(S): 50 CAPSULE, EXTENDED RELEASE ORAL at 16:41

## 2022-02-22 RX ADMIN — Medication 1 DROP(S): at 05:35

## 2022-02-22 RX ADMIN — DIPHENHYDRAMINE HYDROCHLORIDE AND LIDOCAINE HYDROCHLORIDE AND ALUMINUM HYDROXIDE AND MAGNESIUM HYDRO 10 MILLILITER(S): KIT at 13:18

## 2022-02-22 RX ADMIN — DIPHENHYDRAMINE HYDROCHLORIDE AND LIDOCAINE HYDROCHLORIDE AND ALUMINUM HYDROXIDE AND MAGNESIUM HYDRO 10 MILLILITER(S): KIT at 17:26

## 2022-02-22 RX ADMIN — Medication 1 DROP(S): at 02:27

## 2022-02-22 RX ADMIN — MORPHINE SULFATE 2 MILLIGRAM(S): 50 CAPSULE, EXTENDED RELEASE ORAL at 22:36

## 2022-02-22 RX ADMIN — Medication 1 DROP(S): at 17:27

## 2022-02-22 RX ADMIN — LIDOCAINE 10 MILLILITER(S): 4 CREAM TOPICAL at 17:26

## 2022-02-22 RX ADMIN — DIPHENHYDRAMINE HYDROCHLORIDE AND LIDOCAINE HYDROCHLORIDE AND ALUMINUM HYDROXIDE AND MAGNESIUM HYDRO 10 MILLILITER(S): KIT at 05:34

## 2022-02-22 RX ADMIN — MORPHINE SULFATE 1 MILLIGRAM(S): 50 CAPSULE, EXTENDED RELEASE ORAL at 02:27

## 2022-02-22 RX ADMIN — MORPHINE SULFATE 1 MILLIGRAM(S): 50 CAPSULE, EXTENDED RELEASE ORAL at 03:00

## 2022-02-22 RX ADMIN — LIDOCAINE 10 MILLILITER(S): 4 CREAM TOPICAL at 05:33

## 2022-02-22 NOTE — PROGRESS NOTE ADULT - ASSESSMENT
Favor RIME (reactive infectious mucocutaneous eruption) 2/2 recent COVID infection vs MIRM 2/2 Mycoplasma exposure given positive Mycoplasma IgM, with predominant mucosal > cutaneous involvement and with patient’s reported history of similar type of presentation with chlamydia pneumonia in the past. RIME has predominant mucosal involvement and minor skin manifestations and the latency times between symptoms of COVID-19 infection and mucosal eruptions range from 4 days to 12 weeks, with oral lesions appearing 4 to 7 days after systemic symptoms and resolving in 5 to 21 days. Differential also includes mucosal-dominant erythema multiforme. Patient now with slow, mild improvement s/p 3 days of solumedrol.     At this time:   - Recommend decreasing to 80 mg IV solumedrol tomorrow (please administer in the morning if possible)  - Please check HSV IgM and IgG (though unlikely to  at this point, would help to differentiate between EM and RIME/MIRM which may be useful in case of recurrence)   - Recommend continuing magic mouthwash    Shyam Fonseca MD  Resident Physician, PGY3  [facility] Dermatology  Pager: [phone]  Office: [phone]    The patient's chart and photographs were reviewed securely with the dermatology attending Dr. Tellez  Recommendations were communicated with the primary team.  Please page [phone] for further related questions.

## 2022-02-22 NOTE — PROGRESS NOTE ADULT - SUBJECTIVE AND OBJECTIVE BOX
[facility] DEPARTMENT OF OPHTHALMOLOGY  ------------------------------------------------------------------------------  Shayy Ricketts MD, MPH, PGY-3  Pager: [phone]  ------------------------------------------------------------------------------    Interval History: Pt without new visual complaints.     MEDICATIONS  (STANDING):  enoxaparin Injectable 40 milliGRAM(s) SubCutaneous daily  erythromycin   Ointment 1 Application(s) Both EYES two times a day  FIRST- Mouthwash  BLM 10 milliLiter(s) Swish and Spit every 4 hours  lactated ringers. 1000 milliLiter(s) (125 mL/Hr) IV Continuous <Continuous>  lidocaine 2% Viscous 10 milliLiter(s) Swish and Spit every 4 hours  ofloxacin 0.3% Solution 1 Drop(s) Both EYES four times a day  senna 2 Tablet(s) Oral at bedtime    MEDICATIONS  (PRN):  acetaminophen     Tablet .. 650 milliGRAM(s) Oral every 6 hours PRN Temp greater or equal to 38C (100.4F), Mild Pain (1 - 3)  artificial tears (preservative free) Ophthalmic Solution 1 Drop(s) Both EYES every 1 hour PRN Dry Eyes  diphenhydrAMINE 25 milliGRAM(s) Oral every 6 hours PRN Rash and/or Itching  melatonin 3 milliGRAM(s) Oral at bedtime PRN Insomnia  morphine  - Injectable 2 milliGRAM(s) IV Push every 4 hours PRN Severe Pain (7 - 10)  morphine  - Injectable 1 milliGRAM(s) IV Push every 4 hours PRN Moderate Pain (4 - 6)      VITALS: T(C): 37.1 (02-22-22 @ 04:03)  T(F): 98.8 (02-22-22 @ 04:03), Max: 98.8 (02-22-22 @ 04:03)  HR: 75 (02-22-22 @ 04:03) (70 - 75)  BP: 144/82 (02-22-22 @ 04:03) (129/79 - 144/82)  RR:  (18 - 18)  SpO2:  (97% - 98%)  Wt(kg): --  General: AAO x 3, appropriate mood and affect      Ophthalmology Exam:  Visual acuity (sc): 20/20 OD 20/20 OS measured prior, pt reports no visual change   Pupils: PERRL OU, no APD  Ttono: IOP OD 14 OS 11 measured prior   Extraocular movements (EOMs): Full OU, no pain, no diplopia    Pen Light Exam (PLE)  External: Flat OU  Lids/Lashes/Lacrimal Ducts: Flat OU; fornices swept prior with no pseudomembranes  Sclera/Conjunctiva: 1+ injection OU, fluorescein uptake on bulbar conj nasally, temporally, inferiorly OD, nasally OS; no conj sloughing; no discharge  Cornea: Cl OU, no epi defect  Anterior Chamber: D+F OU    Iris: Flat OU  Lens: Cl OU    Assessment and Plan:     26y male w/ no significant pmhx/ochx consulted for red eyes. Exam stable today, pt without new ocular complaints.     # Presumed RIME  - VA intact; EOMs full; 1-2+ injection OU; fornices swept yesterday with no pseudomembranes; no epi defect on corneas; no conj sloughing; no discharge  - Fluorescein uptake on bulbar conjunctiva OD>OS - stable from yesterday  - Low suspicion for SJS from ophtho stand point  - C/w Oflox qid to both eyes   - Continue erythromycin ointment, BID to both eyes (directly into the eyes)  - Appreciate derm input  - Rest of work-up and plan per primary team  - Findings and plan discussed with patient and primary team.    Case SDW Dr. Ezekiel Bishop, attending.     Outpatient follow-up: Patient should follow-up with his/her ophthalmologist or with [facility] Department of Ophthalmology at the address below     [address] [place] [address]. [address]  [place] [address]  [phone]

## 2022-02-22 NOTE — PROGRESS NOTE ADULT - ASSESSMENT
26M with mucucutaneous rash s/p covid illness. 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     Agree with infectious w/up which was negative to date.  Mild leukocystosis, resolved  No fevers    HIV negative  EBV likely past infection  HSV negative, syphilis negative, Gc/chlamydia negative, mycoplasma likely indicative of past infection. Treated with azithro alreay      Steroids as per derm  Derm f/up today  Ophthal eval recommended and done.   Seems to be improving slowly  Will continue to monitor    Would hold on further antimicrobials at this time.  Do not want to confuse the picture in case rash gets worse before it gets better.  If fever, check blood cultures x 2.  Chest xray clear. Urine benign.    D/w  mother and pt.    Stephy Pickett MD  [phone] (office)

## 2022-02-22 NOTE — PROGRESS NOTE ADULT - SUBJECTIVE AND OBJECTIVE BOX
26yPatient is a 26y old  Male who presents with a chief complaint of rash and pain (20 Feb 2022 18:54)      Interval history:  Swelling slightly decreased of lips and face  Tongue is hurting more today  Apprec derm and opthal eval  HSV negative, HIV negative, Syphilis negative, RVP negative, GC/Chlaymdia negative  mycoplasma IgM and IgG positive, likely past infection as Rvp also tests for mycoplasma and was negative  pt already treated with azithromycin last week anyway  slight cough yesterday, resolved today.  Not able to eat or drink much due to mouth sores/pain  No fevers    Antimicrobials:  off    MEDICATIONS  (STANDING):  acetaminophen     Tablet .. 650 every 6 hours PRN  diphenhydrAMINE 25 every 6 hours PRN  enoxaparin Injectable 40 daily  melatonin 3 at bedtime PRN  methylPREDNISolone sodium succinate Injectable 100 once  morphine  - Injectable 2 every 4 hours PRN  morphine  - Injectable 1 every 4 hours PRN  senna 2 at bedtime          Vital Signs Last 24 Hrs  T(F): 98.8 (02-22-22 @ 04:03), Max: 98.8 (02-22-22 @ 04:03)  HR: 75 (02-22-22 @ 04:03)  BP: 144/82 (02-22-22 @ 04:03)  RR: 18 (02-22-22 @ 04:03)  SpO2: 98% (02-22-22 @ 04:03) (97% - 98%)  Wt(kg): --      Gen: Appears unwell  HEENT: EOMI, PERRL, Conjunctivae injected but better  significant Oral lesions noted on lips and tongue  Neck: Supple  CV: S1S2 RRR  Resp: CTA b/l  Abd: +bs, soft  Ext: No edema  Skin: Target like lesions on arms, legs. Small - most about 1 cm in size or smaller. New ones adjacent to nail beds  Penis: skin desquamation noted                          13.6   9.15  )-----------( 300      ( 22 Feb 2022 07:03 )             41.5 02-22    137  |  100  |  8   ----------------------------<  87  3.5   |  25  |  0.91  Ca    9.5      22 Feb 2022 07:03  TPro  6.8  /  Alb  4.0  /  TBili  0.5  /  DBili  x   /  AST  8   /  ALT  7   /  AlkPhos  49  02-21                        13.5   9.83  )-----------( 299      ( 21 Feb 2022 06:44 )             38.3   02-21    138  |  102  |  9   ----------------------------<  89  4.1   |  26  |  0.85    Ca    9.6      21 Feb 2022 06:42  Phos  3.7     02-20  Mg     2.3     02-20    TPro  6.8  /  Alb  4.0  /  TBili  0.5  /  DBili  x   /  AST  8<L>  /  ALT  7<L>  /  AlkPhos  49  02-21      LIVER FUNCTIONS - ( 21 Feb 2022 06:42 )  Alb: 4.0 g/dL / Pro: 6.8 g/dL / ALK PHOS: 49 U/L / ALT: 7 U/L / AST: 8 U/L / GGT: x             RECENT CULTURES:      Radiology:  < from: Xray Chest 2 Views PA/Lat (02.19.22 @ 20:32) >    The lungs are clear.  No pleural effusion or pneumothorax.  Heart size is within normal limits.  No acute abnormality within visible osseous structures.      IMPRESSION:    Clear lungs.    < end of copied text >

## 2022-02-22 NOTE — PROGRESS NOTE ADULT - SUBJECTIVE AND OBJECTIVE BOX
INTERVAL HPI/OVERNIGHT EVENTS:  Patient remains afebrile   Reports minimal improvement in mouth pain, . Patient only able to take a few sips of Ensure after lidocaine mouth wash  Reports improvement in dysuria   No new cutaneous lesions in over 24 hours per patient     MEDICATIONS  (STANDING):  enoxaparin Injectable 40 milliGRAM(s) SubCutaneous daily  erythromycin   Ointment 1 Application(s) Both EYES two times a day  FIRST- Mouthwash  BLM 10 milliLiter(s) Swish and Spit every 4 hours  lactated ringers. 1000 milliLiter(s) (125 mL/Hr) IV Continuous <Continuous>  lidocaine 2% Viscous 10 milliLiter(s) Swish and Spit every 4 hours  ofloxacin 0.3% Solution 1 Drop(s) Both EYES four times a day  senna 2 Tablet(s) Oral at bedtime    MEDICATIONS  (PRN):  acetaminophen     Tablet .. 650 milliGRAM(s) Oral every 6 hours PRN Temp greater or equal to 38C (100.4F), Mild Pain (1 - 3)  artificial tears (preservative free) Ophthalmic Solution 1 Drop(s) Both EYES every 1 hour PRN Dry Eyes  diphenhydrAMINE 25 milliGRAM(s) Oral every 6 hours PRN Rash and/or Itching  melatonin 3 milliGRAM(s) Oral at bedtime PRN Insomnia  morphine  - Injectable 2 milliGRAM(s) IV Push every 4 hours PRN Severe Pain (7 - 10)  morphine  - Injectable 1 milliGRAM(s) IV Push every 4 hours PRN Moderate Pain (4 - 6)      Allergies    amoxicillin (Hives)  cephalosporins (Hives)  cephalosporins (Unknown)    Intolerances        REVIEW OF SYSTEMS      General: no fevers/chills, no NS	    Skin: see HPI  	  Ophthalmologic: no eye pain or change in vision    Genitourinary: no dysuria or hematuria    Musculoskeletal: no joint pains or weakness	    Neurological:no weakness or tingling          Vital Signs Last 24 Hrs  T(C): 37.1 (22 Feb 2022 04:03), Max: 37.1 (21 Feb 2022 21:15)  T(F): 98.8 (22 Feb 2022 04:03), Max: 98.8 (22 Feb 2022 04:03)  HR: 75 (22 Feb 2022 04:03) (70 - 75)  BP: 144/82 (22 Feb 2022 04:03) (129/79 - 144/82)  BP(mean): --  RR: 18 (22 Feb 2022 04:03) (18 - 18)  SpO2: 98% (22 Feb 2022 04:03) (97% - 98%)    PHYSICAL EXAM:   The patient was alert and oriented X 3, well nourished, and in no apparent distress.  There was no visible lymphadenopathy.  Conjunctiva were non injected  There was no clubbing or edema of extremities.    Of note on skin exam:   erosions of the vermillion lips with serous exudate, erosions of the hard and soft palate, white plaque of the tongue and erosions the lateral aspects of the tongue, scattered red-brown papular targetoid lesions with two zones of color of the trunk and extremities, redness of conjunctiva bilaterally     LABS:                        13.6   9.15  )-----------( 300      ( 22 Feb 2022 07:03 )             41.5     02-22    137  |  100  |  8   ----------------------------<  87  3.5   |  25  |  0.91    Ca    9.5      22 Feb 2022 07:03    TPro  6.8  /  Alb  4.0  /  TBili  0.5  /  DBili  x   /  AST  8<L>  /  ALT  7<L>  /  AlkPhos  49  02-21    PTT - ( 22 Feb 2022 02:51 )  PTT:29.4 sec      RADIOLOGY & ADDITIONAL TESTS:

## 2022-02-22 NOTE — PROGRESS NOTE ADULT - SUBJECTIVE AND OBJECTIVE BOX
SUBJECTIVE / OVERNIGHT EVENTS:c/o oral pain  22    MEDICATIONS  (STANDING):  enoxaparin Injectable 40 milliGRAM(s) SubCutaneous daily  erythromycin   Ointment 1 Application(s) Both EYES two times a day  FIRST- Mouthwash  BLM 10 milliLiter(s) Swish and Spit every 4 hours  lactated ringers. 1000 milliLiter(s) (125 mL/Hr) IV Continuous <Continuous>  lidocaine 2% Viscous 10 milliLiter(s) Swish and Spit every 4 hours  ofloxacin 0.3% Solution 1 Drop(s) Both EYES four times a day  senna 2 Tablet(s) Oral at bedtime    MEDICATIONS  (PRN):  acetaminophen     Tablet .. 650 milliGRAM(s) Oral every 6 hours PRN Temp greater or equal to 38C (100.4F), Mild Pain (1 - 3)  artificial tears (preservative free) Ophthalmic Solution 1 Drop(s) Both EYES every 1 hour PRN Dry Eyes  diphenhydrAMINE 25 milliGRAM(s) Oral every 6 hours PRN Rash and/or Itching  melatonin 3 milliGRAM(s) Oral at bedtime PRN Insomnia  morphine  - Injectable 2 milliGRAM(s) IV Push every 4 hours PRN Severe Pain (7 - 10)  morphine  - Injectable 1 milliGRAM(s) IV Push every 4 hours PRN Moderate Pain (4 - 6)    Vital Signs Last 24 Hrs  T(C): 37.1 (22 @ 04:03), Max: 37.1 (22 @ 04:03)  T(F): 98.8 (22 @ 04:03), Max: 98.8 (22 @ 04:03)  HR: 75 (22 @ 04:03) (75 - 75)  BP: 144/82 (22 @ 04:03) (144/82 - 144/82)  BP(mean): --  RR: 18 (22 @ 04:03) (18 - 18)  SpO2: 98% (22 @ 04:03) (98% - 98%)        Constitutional: No fever, fatigue  Skin: No rash.  Eyes: No recent vision problems or eye pain.  ENT: No congestion, ear pain, or sore throat.  Cardiovascular: No chest pain or palpation.  Respiratory: No cough, shortness of breath, congestion, or wheezing.  Gastrointestinal: No abdominal pain, nausea, vomiting, or diarrhea.  Genitourinary: No dysuria.  Musculoskeletal: No joint swelling.  Neurologic: No headache.    PHYSICAL EXAM:  GENERAL: NAD  EYES: EOMI, PERRLA, erythematous sclera+  oral lesions+  NECK: Supple, No JVD  CHEST/LUNG: cta chacorta  HEART:  S1 , S2 +  ABDOMEN: soft , bs+  EXTREMITIES:  no edema  NEUROLOGY:alert awake oriented      LABS:      137  |  100  |  8   ----------------------------<  87  3.5   |  25  |  0.91    Ca    9.5      2022 07:03    TPro  6.8  /  Alb  4.0  /  TBili  0.5  /  DBili      /  AST  8<L>  /  ALT  7<L>  /  AlkPhos  49  02-    Creatinine Trend: 0.91 <--, 0.85 <--, 0.90 <--, 1.04 <--                        13.6   9.15  )-----------( 300      ( 2022 07:03 )             41.5     Urine Studies:  Urinalysis Basic - ( 2022 20:57 )    Color: Yellow / Appearance: Clear / S.032 / pH:   Gluc:  / Ketone: Moderate  / Bili: Negative / Urobili: Negative   Blood:  / Protein: 30 mg/dL / Nitrite: Negative   Leuk Esterase: Negative / RBC: 0 /hpf / WBC 4 /HPF   Sq Epi:  / Non Sq Epi: 2 /hpf / Bacteria: Negative              LIVER FUNCTIONS - ( 2022 06:42 )  Alb: 4.0 g/dL / Pro: 6.8 g/dL / ALK PHOS: 49 U/L / ALT: 7 U/L / AST: 8 U/L / GGT: x           PTT - ( 2022 02:51 )  PTT:29.4 sec  Consultant(s) Notes Reviewed:      Care Discussed with Consultants/Other Providers:

## 2022-02-23 LAB
AGGLUTINATION: PRESENT — SIGNIFICANT CHANGE UP
ANION GAP SERPL CALC-SCNC: 14 MMOL/L — SIGNIFICANT CHANGE UP (ref 5–17)
BASOPHILS # BLD AUTO: 0 K/UL — SIGNIFICANT CHANGE UP (ref 0–0.2)
BASOPHILS NFR BLD AUTO: 0 % — SIGNIFICANT CHANGE UP (ref 0–2)
BUN SERPL-MCNC: 8 MG/DL — SIGNIFICANT CHANGE UP (ref 7–23)
CALCIUM SERPL-MCNC: 9.9 MG/DL — SIGNIFICANT CHANGE UP (ref 8.4–10.5)
CHLORIDE SERPL-SCNC: 101 MMOL/L — SIGNIFICANT CHANGE UP (ref 96–108)
CO2 SERPL-SCNC: 24 MMOL/L — SIGNIFICANT CHANGE UP (ref 22–31)
CREAT SERPL-MCNC: 0.75 MG/DL — SIGNIFICANT CHANGE UP (ref 0.5–1.3)
EOSINOPHIL # BLD AUTO: 0 K/UL — SIGNIFICANT CHANGE UP (ref 0–0.5)
EOSINOPHIL NFR BLD AUTO: 0 % — SIGNIFICANT CHANGE UP (ref 0–6)
GIANT PLATELETS BLD QL SMEAR: PRESENT — SIGNIFICANT CHANGE UP
GLUCOSE SERPL-MCNC: 98 MG/DL — SIGNIFICANT CHANGE UP (ref 70–99)
HCT VFR BLD CALC: 40.5 % — SIGNIFICANT CHANGE UP (ref 39–50)
HGB BLD-MCNC: 13.8 G/DL — SIGNIFICANT CHANGE UP (ref 13–17)
HSV1 IGG SER-ACNC: 0.36 INDEX — SIGNIFICANT CHANGE UP
HSV1 IGG SERPL QL IA: NEGATIVE — SIGNIFICANT CHANGE UP
HSV2 IGG FLD-ACNC: 0.08 INDEX — SIGNIFICANT CHANGE UP
HSV2 IGG SERPL QL IA: NEGATIVE — SIGNIFICANT CHANGE UP
LYMPHOCYTES # BLD AUTO: 1.08 K/UL — SIGNIFICANT CHANGE UP (ref 1–3.3)
LYMPHOCYTES # BLD AUTO: 12.9 % — LOW (ref 13–44)
MAGNESIUM SERPL-MCNC: 2.2 MG/DL — SIGNIFICANT CHANGE UP (ref 1.6–2.6)
MANUAL SMEAR VERIFICATION: SIGNIFICANT CHANGE UP
MCHC RBC-ENTMCNC: 29.7 PG — SIGNIFICANT CHANGE UP (ref 27–34)
MCHC RBC-ENTMCNC: 34.1 GM/DL — SIGNIFICANT CHANGE UP (ref 32–36)
MCV RBC AUTO: 87.3 FL — SIGNIFICANT CHANGE UP (ref 80–100)
MONOCYTES # BLD AUTO: 0.66 K/UL — SIGNIFICANT CHANGE UP (ref 0–0.9)
MONOCYTES NFR BLD AUTO: 7.8 % — SIGNIFICANT CHANGE UP (ref 2–14)
NEUTROPHILS # BLD AUTO: 6.52 K/UL — SIGNIFICANT CHANGE UP (ref 1.8–7.4)
NEUTROPHILS NFR BLD AUTO: 77.6 % — HIGH (ref 43–77)
PLAT MORPH BLD: NORMAL — SIGNIFICANT CHANGE UP
PLATELET # BLD AUTO: 376 K/UL — SIGNIFICANT CHANGE UP (ref 150–400)
POTASSIUM SERPL-MCNC: 3.7 MMOL/L — SIGNIFICANT CHANGE UP (ref 3.5–5.3)
POTASSIUM SERPL-SCNC: 3.7 MMOL/L — SIGNIFICANT CHANGE UP (ref 3.5–5.3)
RBC # BLD: 4.64 M/UL — SIGNIFICANT CHANGE UP (ref 4.2–5.8)
RBC # FLD: 11.9 % — SIGNIFICANT CHANGE UP (ref 10.3–14.5)
RBC BLD AUTO: ABNORMAL
SODIUM SERPL-SCNC: 139 MMOL/L — SIGNIFICANT CHANGE UP (ref 135–145)
VARIANT LYMPHS # BLD: 1.7 % — SIGNIFICANT CHANGE UP (ref 0–6)
WBC # BLD: 8.4 K/UL — SIGNIFICANT CHANGE UP (ref 3.8–10.5)
WBC # FLD AUTO: 8.4 K/UL — SIGNIFICANT CHANGE UP (ref 3.8–10.5)

## 2022-02-23 PROCEDURE — 99447 NTRPROF PH1/NTRNET/EHR 11-20: CPT

## 2022-02-23 PROCEDURE — 99232 SBSQ HOSP IP/OBS MODERATE 35: CPT

## 2022-02-23 RX ORDER — MUPIROCIN 20 MG/G
1 OINTMENT TOPICAL
Refills: 0 | Status: DISCONTINUED | OUTPATIENT
Start: 2022-02-23 | End: 2022-02-25

## 2022-02-23 RX ORDER — PETROLATUM,WHITE
1 JELLY (GRAM) TOPICAL
Refills: 0 | Status: DISCONTINUED | OUTPATIENT
Start: 2022-02-23 | End: 2022-02-25

## 2022-02-23 RX ORDER — PREDNISOLONE SODIUM PHOSPHATE 1 %
1 DROPS OPHTHALMIC (EYE)
Refills: 0 | Status: DISCONTINUED | OUTPATIENT
Start: 2022-02-23 | End: 2022-02-25

## 2022-02-23 RX ADMIN — LIDOCAINE 10 MILLILITER(S): 4 CREAM TOPICAL at 21:35

## 2022-02-23 RX ADMIN — MORPHINE SULFATE 2 MILLIGRAM(S): 50 CAPSULE, EXTENDED RELEASE ORAL at 12:04

## 2022-02-23 RX ADMIN — DIPHENHYDRAMINE HYDROCHLORIDE AND LIDOCAINE HYDROCHLORIDE AND ALUMINUM HYDROXIDE AND MAGNESIUM HYDRO 10 MILLILITER(S): KIT at 09:13

## 2022-02-23 RX ADMIN — MORPHINE SULFATE 2 MILLIGRAM(S): 50 CAPSULE, EXTENDED RELEASE ORAL at 21:35

## 2022-02-23 RX ADMIN — LIDOCAINE 10 MILLILITER(S): 4 CREAM TOPICAL at 13:52

## 2022-02-23 RX ADMIN — Medication 1 APPLICATION(S): at 18:04

## 2022-02-23 RX ADMIN — MORPHINE SULFATE 2 MILLIGRAM(S): 50 CAPSULE, EXTENDED RELEASE ORAL at 02:23

## 2022-02-23 RX ADMIN — SENNA PLUS 2 TABLET(S): 8.6 TABLET ORAL at 21:35

## 2022-02-23 RX ADMIN — MUPIROCIN 1 APPLICATION(S): 20 OINTMENT TOPICAL at 18:16

## 2022-02-23 RX ADMIN — DIPHENHYDRAMINE HYDROCHLORIDE AND LIDOCAINE HYDROCHLORIDE AND ALUMINUM HYDROXIDE AND MAGNESIUM HYDRO 10 MILLILITER(S): KIT at 06:42

## 2022-02-23 RX ADMIN — DIPHENHYDRAMINE HYDROCHLORIDE AND LIDOCAINE HYDROCHLORIDE AND ALUMINUM HYDROXIDE AND MAGNESIUM HYDRO 10 MILLILITER(S): KIT at 18:01

## 2022-02-23 RX ADMIN — MORPHINE SULFATE 2 MILLIGRAM(S): 50 CAPSULE, EXTENDED RELEASE ORAL at 06:47

## 2022-02-23 RX ADMIN — LIDOCAINE 10 MILLILITER(S): 4 CREAM TOPICAL at 01:31

## 2022-02-23 RX ADMIN — DIPHENHYDRAMINE HYDROCHLORIDE AND LIDOCAINE HYDROCHLORIDE AND ALUMINUM HYDROXIDE AND MAGNESIUM HYDRO 10 MILLILITER(S): KIT at 13:52

## 2022-02-23 RX ADMIN — Medication 1 DROP(S): at 18:01

## 2022-02-23 RX ADMIN — ENOXAPARIN SODIUM 40 MILLIGRAM(S): 100 INJECTION SUBCUTANEOUS at 13:52

## 2022-02-23 RX ADMIN — Medication 1 DROP(S): at 06:47

## 2022-02-23 RX ADMIN — LIDOCAINE 10 MILLILITER(S): 4 CREAM TOPICAL at 18:01

## 2022-02-23 RX ADMIN — MORPHINE SULFATE 2 MILLIGRAM(S): 50 CAPSULE, EXTENDED RELEASE ORAL at 05:00

## 2022-02-23 RX ADMIN — Medication 1 APPLICATION(S): at 06:42

## 2022-02-23 RX ADMIN — Medication 1 APPLICATION(S): at 18:01

## 2022-02-23 RX ADMIN — LIDOCAINE 10 MILLILITER(S): 4 CREAM TOPICAL at 06:42

## 2022-02-23 RX ADMIN — Medication 80 MILLIGRAM(S): at 09:14

## 2022-02-23 RX ADMIN — DIPHENHYDRAMINE HYDROCHLORIDE AND LIDOCAINE HYDROCHLORIDE AND ALUMINUM HYDROXIDE AND MAGNESIUM HYDRO 10 MILLILITER(S): KIT at 01:31

## 2022-02-23 RX ADMIN — MORPHINE SULFATE 2 MILLIGRAM(S): 50 CAPSULE, EXTENDED RELEASE ORAL at 08:06

## 2022-02-23 RX ADMIN — MORPHINE SULFATE 2 MILLIGRAM(S): 50 CAPSULE, EXTENDED RELEASE ORAL at 17:05

## 2022-02-23 RX ADMIN — SODIUM CHLORIDE 125 MILLILITER(S): 9 INJECTION, SOLUTION INTRAVENOUS at 16:20

## 2022-02-23 RX ADMIN — LIDOCAINE 10 MILLILITER(S): 4 CREAM TOPICAL at 09:12

## 2022-02-23 RX ADMIN — MORPHINE SULFATE 2 MILLIGRAM(S): 50 CAPSULE, EXTENDED RELEASE ORAL at 11:04

## 2022-02-23 RX ADMIN — Medication 1 DROP(S): at 13:52

## 2022-02-23 RX ADMIN — MORPHINE SULFATE 2 MILLIGRAM(S): 50 CAPSULE, EXTENDED RELEASE ORAL at 16:01

## 2022-02-23 RX ADMIN — Medication 1 DROP(S): at 18:04

## 2022-02-23 NOTE — PROGRESS NOTE ADULT - ASSESSMENT
Favor RIME (reactive infectious mucocutaneous eruption) 2/2 recent COVID infection vs MIRM 2/2 Mycoplasma exposure given positive Mycoplasma IgM, with predominant mucosal > cutaneous involvement and with patient’s reported history of similar type of presentation with chlamydia pneumonia in the past. RIME has predominant mucosal involvement and minor skin manifestations and the latency times between symptoms of COVID-19 infection and mucosal eruptions range from 4 days to 12 weeks, with oral lesions appearing 4 to 7 days after systemic symptoms and resolving in 5 to 21 days. Differential also includes mucosal-dominant erythema multiforme. Patient now with continued improvement s/p 4 days of solumedrol.     At this time:   - Recommend decreasing to 40mg of po prednisone daily (please administer in morning if possible). Plan will be to do 5 days of prednisone 40mg daily   - Please start mupirocin ointment twice daily to AA on penis (will help prevent infection)   - Will f/u HSV IgM and IgG (though unlikely to  at this point, would help to differentiate between EM and RIME/MIRM which may be useful in case of recurrence)   - Recommend continuing magic mouthwash    Shyam Fonseca MD  Resident Physician, PGY3  [facility] Dermatology  Pager: [phone]  Office: [phone]    The patient's chart and photographs were reviewed securely with the dermatology attending Dr. Tellez  Recommendations were communicated with the primary team.  Please page [phone] for further related questions.

## 2022-02-23 NOTE — PROGRESS NOTE ADULT - SUBJECTIVE AND OBJECTIVE BOX
26yPatient is a 26y old  Male who presents with a chief complaint of rash and pain (20 Feb 2022 18:54)      Interval history:  Seems slightly improved today.  Apprec derm and opthal eval  HSV negative, HIV negative, Syphilis negative, RVP negative, GC/Chlaymdia negative  mycoplasma IgM and IgG positive, likely past infection as Rvp also tests for mycoplasma and was negative  pt already treated with azithromycin last week anyway  No fevers    Antimicrobials:  off    MEDICATIONS  (STANDING):  acetaminophen     Tablet .. 650 every 6 hours PRN  diphenhydrAMINE 25 every 6 hours PRN  enoxaparin Injectable 40 daily  melatonin 3 at bedtime PRN  morphine  - Injectable 2 every 4 hours PRN  morphine  - Injectable 1 every 4 hours PRN  senna 2 at bedtime        Vital Signs Last 24 Hrs  T(F): 97.9 (02-23-22 @ 04:52), Max: 98.2 (02-23-22 @ 02:20)  HR: 65 (02-23-22 @ 04:52)  BP: 124/76 (02-23-22 @ 04:52)  RR: 18 (02-23-22 @ 04:52)  SpO2: 97% (02-23-22 @ 04:52) (96% - 97%)  Wt(kg): --    Gen: Appears unwell  HEENT: EOMI, PERRL, Conjunctivae muchbetter  significant Oral lesions noted on lips and tongue  Neck: Supple  CV: S1S2 RRR  Resp: CTA b/l  Abd: +bs, soft  Ext: No edema  Skin: Target like lesions on arms, legs. Small - most about 1 cm in size or smaller. New ones adjacent to nail beds  Penis: skin desquamation noted on prior exam                          13.8   8.40  )-----------( 376      ( 23 Feb 2022 06:39 )             40.5 02-23    139  |  101  |  8   ----------------------------<  98  3.7   |  24  |  0.75  Ca    9.9      23 Feb 2022 06:39Mg     2.2     02-23                            13.6   9.15  )-----------( 300      ( 22 Feb 2022 07:03 )             41.5 02-22    137  |  100  |  8   ----------------------------<  87  3.5   |  25  |  0.91  Ca    9.5      22 Feb 2022 07:03  TPro  6.8  /  Alb  4.0  /  TBili  0.5  /  DBili  x   /  AST  8   /  ALT  7   /  AlkPhos  49  02-21                        13.5   9.83  )-----------( 299      ( 21 Feb 2022 06:44 )             38.3   02-21    138  |  102  |  9   ----------------------------<  89  4.1   |  26  |  0.85    Ca    9.6      21 Feb 2022 06:42  Phos  3.7     02-20  Mg     2.3     02-20    TPro  6.8  /  Alb  4.0  /  TBili  0.5  /  DBili  x   /  AST  8<L>  /  ALT  7<L>  /  AlkPhos  49  02-21      LIVER FUNCTIONS - ( 21 Feb 2022 06:42 )  Alb: 4.0 g/dL / Pro: 6.8 g/dL / ALK PHOS: 49 U/L / ALT: 7 U/L / AST: 8 U/L / GGT: x             RECENT CULTURES:      Radiology:  < from: Xray Chest 2 Views PA/Lat (02.19.22 @ 20:32) >    The lungs are clear.  No pleural effusion or pneumothorax.  Heart size is within normal limits.  No acute abnormality within visible osseous structures.      IMPRESSION:    Clear lungs.    < end of copied text >

## 2022-02-23 NOTE — PROGRESS NOTE ADULT - SUBJECTIVE AND OBJECTIVE BOX
SUBJECTIVE / OVERNIGHT EVENTS:c/o oral pain  22    MEDICATIONS  (STANDING):  AQUAPHOR (petrolatum Ointment) 1 Application(s) Topical four times a day  enoxaparin Injectable 40 milliGRAM(s) SubCutaneous daily  erythromycin   Ointment 1 Application(s) Both EYES two times a day  lactated ringers. 1000 milliLiter(s) (125 mL/Hr) IV Continuous <Continuous>  lidocaine 2% Viscous 10 milliLiter(s) Swish and Spit every 4 hours  mupirocin 2% Ointment 1 Application(s) Topical two times a day  ofloxacin 0.3% Solution 1 Drop(s) Both EYES four times a day  prednisoLONE acetate 1% Suspension 1 Drop(s) Both EYES four times a day  senna 2 Tablet(s) Oral at bedtime    MEDICATIONS  (PRN):  acetaminophen     Tablet .. 650 milliGRAM(s) Oral every 6 hours PRN Temp greater or equal to 38C (100.4F), Mild Pain (1 - 3)  artificial tears (preservative free) Ophthalmic Solution 1 Drop(s) Both EYES every 1 hour PRN Dry Eyes  diphenhydrAMINE 25 milliGRAM(s) Oral every 6 hours PRN Rash and/or Itching  melatonin 3 milliGRAM(s) Oral at bedtime PRN Insomnia  morphine  - Injectable 2 milliGRAM(s) IV Push every 4 hours PRN Severe Pain (7 - 10)  morphine  - Injectable 1 milliGRAM(s) IV Push every 4 hours PRN Moderate Pain (4 - 6)    Vital Signs Last 24 Hrs  T(C): 36.9 (22 @ 22:07), Max: 37.1 (22 @ 13:47)  T(F): 98.4 (22 @ 22:07), Max: 98.8 (22 @ 13:47)  HR: 60 (22 @ 22:07) (60 - 65)  BP: 126/69 (22 @ 22:07) (124/76 - 149/74)  BP(mean): --  RR: 18 (22 @ 22:07) (16 - 18)  SpO2: 96% (22 @ 22:07) (96% - 97%)          Constitutional: No fever, fatigue  Skin: No rash.  Eyes: No recent vision problems or eye pain.  ENT: No congestion, ear pain, or sore throat.  Cardiovascular: No chest pain or palpation.  Respiratory: No cough, shortness of breath, congestion, or wheezing.  Gastrointestinal: No abdominal pain, nausea, vomiting, or diarrhea.  Genitourinary: No dysuria.  Musculoskeletal: No joint swelling.  Neurologic: No headache.    PHYSICAL EXAM:  GENERAL: NAD  EYES: EOMI, PERRLA, erythematous sclera+  oral lesions+  NECK: Supple, No JVD  CHEST/LUNG: cta chacorta  HEART:  S1 , S2 +  ABDOMEN: soft , bs+  EXTREMITIES:  no edema  NEUROLOGY:alert awake oriented    LABS:      139  |  101  |  8   ----------------------------<  98  3.7   |  24  |  0.75    Ca    9.9      2022 06:39  Mg     2.2           Creatinine Trend: 0.75 <--, 0.91 <--, 0.85 <--, 0.90 <--, 1.04 <--                        13.8   8.40  )-----------( 376      ( 2022 06:39 )             40.5     Urine Studies:  Urinalysis Basic - ( 2022 20:57 )    Color: Yellow / Appearance: Clear / S.032 / pH:   Gluc:  / Ketone: Moderate  / Bili: Negative / Urobili: Negative   Blood:  / Protein: 30 mg/dL / Nitrite: Negative   Leuk Esterase: Negative / RBC: 0 /hpf / WBC 4 /HPF   Sq Epi:  / Non Sq Epi: 2 /hpf / Bacteria: Negative                PTT - ( 2022 02:51 )  PTT:29.4 sec  Care Discussed with Consultants/Other Providers:

## 2022-02-23 NOTE — PROGRESS NOTE ADULT - ASSESSMENT
26M with mucucutaneous rash s/p covid illness. 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     Agree with infectious w/up which was negative to date.  Mild leukocystosis, resolved  No fevers    HIV negative  EBV likely past infection  HSV negative, syphilis negative, Gc/chlamydia negative, mycoplasma likely indicative of past infection. Treated with azithro alreay  HSV IgM/IgG pending, but HSV pcr swab negative.    Steroids as per derm  Ophthal eval appreciate.  Seems to be improving slowly    Would hold on further antimicrobials at this time.   ID will sign off at this time but please reconsult for any questions or changes.    D/w  mother and pt.    Stephy Pickett MD  [phone] (office)

## 2022-02-23 NOTE — PROGRESS NOTE ADULT - SUBJECTIVE AND OBJECTIVE BOX
INTERVAL HPI/OVERNIGHT EVENTS:    MEDICATIONS  (STANDING):  enoxaparin Injectable 40 milliGRAM(s) SubCutaneous daily  erythromycin   Ointment 1 Application(s) Both EYES two times a day  FIRST- Mouthwash  BLM 10 milliLiter(s) Swish and Spit every 4 hours  lactated ringers. 1000 milliLiter(s) (125 mL/Hr) IV Continuous <Continuous>  lidocaine 2% Viscous 10 milliLiter(s) Swish and Spit every 4 hours  ofloxacin 0.3% Solution 1 Drop(s) Both EYES four times a day  prednisoLONE acetate 1% Suspension 1 Drop(s) Both EYES four times a day  senna 2 Tablet(s) Oral at bedtime    MEDICATIONS  (PRN):  acetaminophen     Tablet .. 650 milliGRAM(s) Oral every 6 hours PRN Temp greater or equal to 38C (100.4F), Mild Pain (1 - 3)  artificial tears (preservative free) Ophthalmic Solution 1 Drop(s) Both EYES every 1 hour PRN Dry Eyes  diphenhydrAMINE 25 milliGRAM(s) Oral every 6 hours PRN Rash and/or Itching  melatonin 3 milliGRAM(s) Oral at bedtime PRN Insomnia  morphine  - Injectable 2 milliGRAM(s) IV Push every 4 hours PRN Severe Pain (7 - 10)  morphine  - Injectable 1 milliGRAM(s) IV Push every 4 hours PRN Moderate Pain (4 - 6)      Allergies    amoxicillin (Hives)  cephalosporins (Hives)  cephalosporins (Unknown)    Intolerances        REVIEW OF SYSTEMS      General: no fevers/chills, no NS	    Skin: see HPI  	  Ophthalmologic: no eye pain or change in vision    Genitourinary: no dysuria or hematuria    Musculoskeletal: no joint pains or weakness	    Neurological:no weakness or tingling          Vital Signs Last 24 Hrs  T(C): 37.1 (23 Feb 2022 13:47), Max: 37.1 (23 Feb 2022 13:47)  T(F): 98.8 (23 Feb 2022 13:47), Max: 98.8 (23 Feb 2022 13:47)  HR: 64 (23 Feb 2022 13:47) (64 - 65)  BP: 149/74 (23 Feb 2022 13:47) (124/76 - 149/74)  BP(mean): --  RR: 18 (23 Feb 2022 13:47) (16 - 18)  SpO2: 96% (23 Feb 2022 13:47) (96% - 97%)    PHYSICAL EXAM:   The patient was alert and oriented X 3, well nourished, and in no apparent distress.  There was no visible lymphadenopathy.  Conjunctiva were non injected  There was no clubbing or edema of extremities.    Of note on skin exam:       LABS:                        13.8   8.40  )-----------( 376      ( 23 Feb 2022 06:39 )             40.5     02-23    139  |  101  |  8   ----------------------------<  98  3.7   |  24  |  0.75    Ca    9.9      23 Feb 2022 06:39  Mg     2.2     02-23      PTT - ( 22 Feb 2022 02:51 )  PTT:29.4 sec      RADIOLOGY & ADDITIONAL TESTS:           INTERVAL HPI/OVERNIGHT EVENTS:  Mucositis continues to slowly improve- patient tolerated a few bites of solid food and able to drink Ensure shakes if has topical lidocaine before  Feels like vision is becoming clearer  Notes that crust from lesion on penis fell off which has made it more tender  No new skin lesions      MEDICATIONS  (STANDING):  enoxaparin Injectable 40 milliGRAM(s) SubCutaneous daily  erythromycin   Ointment 1 Application(s) Both EYES two times a day  FIRST- Mouthwash  BLM 10 milliLiter(s) Swish and Spit every 4 hours  lactated ringers. 1000 milliLiter(s) (125 mL/Hr) IV Continuous <Continuous>  lidocaine 2% Viscous 10 milliLiter(s) Swish and Spit every 4 hours  ofloxacin 0.3% Solution 1 Drop(s) Both EYES four times a day  prednisoLONE acetate 1% Suspension 1 Drop(s) Both EYES four times a day  senna 2 Tablet(s) Oral at bedtime    MEDICATIONS  (PRN):  acetaminophen     Tablet .. 650 milliGRAM(s) Oral every 6 hours PRN Temp greater or equal to 38C (100.4F), Mild Pain (1 - 3)  artificial tears (preservative free) Ophthalmic Solution 1 Drop(s) Both EYES every 1 hour PRN Dry Eyes  diphenhydrAMINE 25 milliGRAM(s) Oral every 6 hours PRN Rash and/or Itching  melatonin 3 milliGRAM(s) Oral at bedtime PRN Insomnia  morphine  - Injectable 2 milliGRAM(s) IV Push every 4 hours PRN Severe Pain (7 - 10)  morphine  - Injectable 1 milliGRAM(s) IV Push every 4 hours PRN Moderate Pain (4 - 6)      Allergies    amoxicillin (Hives)  cephalosporins (Hives)  cephalosporins (Unknown)    Intolerances        REVIEW OF SYSTEMS      General: no fevers/chills, no NS	    Skin: see HPI  	  Ophthalmologic: no eye pain or change in vision    Genitourinary: no dysuria or hematuria    Musculoskeletal: no joint pains or weakness	    Neurological:no weakness or tingling          Vital Signs Last 24 Hrs  T(C): 37.1 (23 Feb 2022 13:47), Max: 37.1 (23 Feb 2022 13:47)  T(F): 98.8 (23 Feb 2022 13:47), Max: 98.8 (23 Feb 2022 13:47)  HR: 64 (23 Feb 2022 13:47) (64 - 65)  BP: 149/74 (23 Feb 2022 13:47) (124/76 - 149/74)  BP(mean): --  RR: 18 (23 Feb 2022 13:47) (16 - 18)  SpO2: 96% (23 Feb 2022 13:47) (96% - 97%)    PHYSICAL EXAM:   The patient was alert and oriented X 3, well nourished, and in no apparent distress.  There was no visible lymphadenopathy.  Conjunctiva were non injected  There was no clubbing or edema of extremities.    Of note on skin exam:   erosions of the vermillion lips with serous exudate, erosions of the hard and soft palate, white plaque of the tongue and erosions the lateral aspects of the tongue, scattered red-brown papular targetoid lesions with two zones of color of the trunk and extremities, erosions of josefa-urethra and around coronal sulcus, redness of conjunctiva bilaterally - all improving     LABS:                        13.8   8.40  )-----------( 376      ( 23 Feb 2022 06:39 )             40.5     02-23    139  |  101  |  8   ----------------------------<  98  3.7   |  24  |  0.75    Ca    9.9      23 Feb 2022 06:39  Mg     2.2     02-23      PTT - ( 22 Feb 2022 02:51 )  PTT:29.4 sec      RADIOLOGY & ADDITIONAL TESTS:

## 2022-02-23 NOTE — PROGRESS NOTE ADULT - SUBJECTIVE AND OBJECTIVE BOX
[facility] DEPARTMENT OF OPHTHALMOLOGY  ------------------------------------------------------------------------------  Shayy Ricketts MD, MPH, PGY-3  Pager: [phone]  ------------------------------------------------------------------------------    Interval History: Pt reports improved pain, no change in vision.     MEDICATIONS  (STANDING):  enoxaparin Injectable 40 milliGRAM(s) SubCutaneous daily  erythromycin   Ointment 1 Application(s) Both EYES two times a day  FIRST- Mouthwash  BLM 10 milliLiter(s) Swish and Spit every 4 hours  lactated ringers. 1000 milliLiter(s) (125 mL/Hr) IV Continuous <Continuous>  lidocaine 2% Viscous 10 milliLiter(s) Swish and Spit every 4 hours  ofloxacin 0.3% Solution 1 Drop(s) Both EYES four times a day  senna 2 Tablet(s) Oral at bedtime    MEDICATIONS  (PRN):  acetaminophen     Tablet .. 650 milliGRAM(s) Oral every 6 hours PRN Temp greater or equal to 38C (100.4F), Mild Pain (1 - 3)  artificial tears (preservative free) Ophthalmic Solution 1 Drop(s) Both EYES every 1 hour PRN Dry Eyes  diphenhydrAMINE 25 milliGRAM(s) Oral every 6 hours PRN Rash and/or Itching  melatonin 3 milliGRAM(s) Oral at bedtime PRN Insomnia  morphine  - Injectable 2 milliGRAM(s) IV Push every 4 hours PRN Severe Pain (7 - 10)  morphine  - Injectable 1 milliGRAM(s) IV Push every 4 hours PRN Moderate Pain (4 - 6)    VITALS: T(C): 37.1 (02-23-22 @ 13:47)  T(F): 98.8 (02-23-22 @ 13:47), Max: 98.8 (02-23-22 @ 13:47)  HR: 64 (02-23-22 @ 13:47) (64 - 65)  BP: 149/74 (02-23-22 @ 13:47) (124/76 - 149/74)  RR:  (16 - 18)  SpO2:  (96% - 97%)  Wt(kg): --  General: AAO x 3, appropriate mood and affect    Ophthalmology Exam:  Visual acuity (sc): 20/20 OD 20/20 OS  Pupils: PERRL OU, no APD  Ttono: IOP OD 14 OS 11 measured prior   Extraocular movements (EOMs): Full OU, no pain, no diplopia    Pen Light Exam (PLE)  External: Flat OU  Lids/Lashes/Lacrimal Ducts: Flat OU; lids everted with no pseudomembranes  Sclera/Conjunctiva: tr injection OU, fluorescein uptake on bulbar conj temporal and inferior (improving) OD, scattered areas nasally OS; no conj sloughing; no discharge  Cornea: Cl OU, no epi defect  Anterior Chamber: D+F OU    Iris: Flat OU  Lens: Cl OU    Assessment and Plan:     26y male w/ no significant pmhx/ochx consulted for red eyes. Exam stable today, pt without new ocular complaints.     # Presumed RIME  - VA intact; EOMs full; 1-2+ injection OU; fornices swept yesterday with no pseudomembranes; no epi defect on corneas; no conj sloughing; no discharge  - Fluorescein uptake on bulbar conjunctiva OD>OS - stable from yesterday  - Low suspicion for SJS from ophtho stand point  - C/w Oflox qid to both eyes, ADD pred forte QID OU   - Continue erythromycin ointment, QHS OU   - Appreciate derm input  - Rest of work-up and plan per primary team  - Findings and plan discussed with patient and primary team.    Case SDW Dr. Caldwell, attending.     Outpatient follow-up: Patient should follow-up with his/her ophthalmologist or with [facility] Department of Ophthalmology at the address below     [address]  [place] [address]  [phone]

## 2022-02-24 PROCEDURE — 99446 NTRPROF PH1/NTRNET/EHR 5-10: CPT

## 2022-02-24 RX ADMIN — Medication 1 DROP(S): at 05:42

## 2022-02-24 RX ADMIN — Medication 1 DROP(S): at 11:27

## 2022-02-24 RX ADMIN — Medication 1 APPLICATION(S): at 05:42

## 2022-02-24 RX ADMIN — LIDOCAINE 10 MILLILITER(S): 4 CREAM TOPICAL at 17:28

## 2022-02-24 RX ADMIN — MORPHINE SULFATE 2 MILLIGRAM(S): 50 CAPSULE, EXTENDED RELEASE ORAL at 05:39

## 2022-02-24 RX ADMIN — Medication 1 DROP(S): at 23:17

## 2022-02-24 RX ADMIN — Medication 1 APPLICATION(S): at 11:27

## 2022-02-24 RX ADMIN — MORPHINE SULFATE 2 MILLIGRAM(S): 50 CAPSULE, EXTENDED RELEASE ORAL at 11:31

## 2022-02-24 RX ADMIN — MUPIROCIN 1 APPLICATION(S): 20 OINTMENT TOPICAL at 17:29

## 2022-02-24 RX ADMIN — LIDOCAINE 10 MILLILITER(S): 4 CREAM TOPICAL at 22:16

## 2022-02-24 RX ADMIN — MUPIROCIN 1 APPLICATION(S): 20 OINTMENT TOPICAL at 05:41

## 2022-02-24 RX ADMIN — SODIUM CHLORIDE 125 MILLILITER(S): 9 INJECTION, SOLUTION INTRAVENOUS at 22:17

## 2022-02-24 RX ADMIN — Medication 1 APPLICATION(S): at 17:29

## 2022-02-24 RX ADMIN — MORPHINE SULFATE 2 MILLIGRAM(S): 50 CAPSULE, EXTENDED RELEASE ORAL at 01:37

## 2022-02-24 RX ADMIN — LIDOCAINE 10 MILLILITER(S): 4 CREAM TOPICAL at 10:50

## 2022-02-24 RX ADMIN — Medication 1 DROP(S): at 17:29

## 2022-02-24 RX ADMIN — Medication 1 APPLICATION(S): at 05:41

## 2022-02-24 RX ADMIN — LIDOCAINE 10 MILLILITER(S): 4 CREAM TOPICAL at 01:37

## 2022-02-24 RX ADMIN — MORPHINE SULFATE 2 MILLIGRAM(S): 50 CAPSULE, EXTENDED RELEASE ORAL at 01:57

## 2022-02-24 RX ADMIN — MORPHINE SULFATE 2 MILLIGRAM(S): 50 CAPSULE, EXTENDED RELEASE ORAL at 10:59

## 2022-02-24 RX ADMIN — LIDOCAINE 10 MILLILITER(S): 4 CREAM TOPICAL at 13:37

## 2022-02-24 RX ADMIN — Medication 1 APPLICATION(S): at 00:13

## 2022-02-24 RX ADMIN — LIDOCAINE 10 MILLILITER(S): 4 CREAM TOPICAL at 05:40

## 2022-02-24 RX ADMIN — Medication 40 MILLIGRAM(S): at 11:41

## 2022-02-24 RX ADMIN — Medication 1 DROP(S): at 00:13

## 2022-02-24 RX ADMIN — Medication 1 APPLICATION(S): at 23:17

## 2022-02-24 RX ADMIN — Medication 650 MILLIGRAM(S): at 15:58

## 2022-02-24 RX ADMIN — SENNA PLUS 2 TABLET(S): 8.6 TABLET ORAL at 22:16

## 2022-02-24 RX ADMIN — Medication 1 APPLICATION(S): at 17:30

## 2022-02-24 RX ADMIN — Medication 650 MILLIGRAM(S): at 17:45

## 2022-02-24 NOTE — PROGRESS NOTE ADULT - PROBLEM SELECTOR PLAN 1
DDx: RIME (reactive infectious mucocutaneous eruption) 2/2 recent COVID infection vs Erythema multiforme 2/2 HSV given target-like lesions vs less likely SJS  - HSV PCR -, Mycoplasma IgM/IgG +, COVID PCR + 2/8/2022 per patient  derm / ID/ Ophthal input appreciated  cont steroids  no abx as per ID   pain control
DDx: RIME (reactive infectious mucocutaneous eruption) 2/2 recent COVID infection vs Erythema multiforme 2/2 HSV given target-like lesions vs less likely SJS  - HSV PCR -, Mycoplasma IgM/IgG +, COVID PCR + 2/8/2022 per patient  derm / ID/ Ophthal input appreciated  cont steroids  no abx as per ID   pain control  improving clinically

## 2022-02-24 NOTE — PROGRESS NOTE ADULT - SUBJECTIVE AND OBJECTIVE BOX
INTERVAL HPI/OVERNIGHT EVENTS:  Patient continues to improve- able to tolerate some solid food today and liquids   Was able to take po prednisone today  Eyes continue to feel less irritating as well  Feels good overall-hoping to go home soon    MEDICATIONS  (STANDING):  AQUAPHOR (petrolatum Ointment) 1 Application(s) Topical four times a day  enoxaparin Injectable 40 milliGRAM(s) SubCutaneous daily  erythromycin   Ointment 1 Application(s) Both EYES two times a day  lactated ringers. 1000 milliLiter(s) (125 mL/Hr) IV Continuous <Continuous>  lidocaine 2% Viscous 10 milliLiter(s) Swish and Spit every 4 hours  mupirocin 2% Ointment 1 Application(s) Topical two times a day  ofloxacin 0.3% Solution 1 Drop(s) Both EYES four times a day  prednisoLONE acetate 1% Suspension 1 Drop(s) Both EYES four times a day  predniSONE   Tablet 40 milliGRAM(s) Oral daily  senna 2 Tablet(s) Oral at bedtime    MEDICATIONS  (PRN):  acetaminophen     Tablet .. 650 milliGRAM(s) Oral every 6 hours PRN Temp greater or equal to 38C (100.4F), Mild Pain (1 - 3)  artificial tears (preservative free) Ophthalmic Solution 1 Drop(s) Both EYES every 1 hour PRN Dry Eyes  diphenhydrAMINE 25 milliGRAM(s) Oral every 6 hours PRN Rash and/or Itching  melatonin 3 milliGRAM(s) Oral at bedtime PRN Insomnia  morphine  - Injectable 2 milliGRAM(s) IV Push every 4 hours PRN Severe Pain (7 - 10)  morphine  - Injectable 1 milliGRAM(s) IV Push every 4 hours PRN Moderate Pain (4 - 6)      Allergies    amoxicillin (Hives)  cephalosporins (Hives)  cephalosporins (Unknown)    Intolerances        REVIEW OF SYSTEMS      General: no fevers/chills, no NS	    Skin: see HPI  	  Ophthalmologic: no eye pain or change in vision    Genitourinary: no dysuria or hematuria    Musculoskeletal: no joint pains or weakness	    Neurological:no weakness or tingling          Vital Signs Last 24 Hrs  T(C): 36.9 (24 Feb 2022 12:32), Max: 36.9 (23 Feb 2022 22:07)  T(F): 98.4 (24 Feb 2022 12:32), Max: 98.4 (23 Feb 2022 22:07)  HR: 73 (24 Feb 2022 12:32) (60 - 73)  BP: 135/80 (24 Feb 2022 12:32) (126/69 - 158/94)  BP(mean): --  RR: 18 (24 Feb 2022 12:32) (18 - 18)  SpO2: 97% (24 Feb 2022 12:32) (96% - 97%)    PHYSICAL EXAM:   The patient was alert and oriented X 3, well nourished, and in no apparent distress.  There was no visible lymphadenopathy.  Conjunctiva were non injected  There was no clubbing or edema of extremities.    Of note on skin exam:   erosions of the vermillion lips with serous exudate, erosions of the hard and soft palate, white plaque of the tongue and erosions the lateral aspects of the tongue, scattered red-brown papular targetoid lesions with two zones of color of the trunk and extremities, erosions of josefa-urethra and around coronal sulcus, redness of conjunctiva bilaterally - all improving     LABS:                        13.8   8.40  )-----------( 376      ( 23 Feb 2022 06:39 )             40.5     02-23    139  |  101  |  8   ----------------------------<  98  3.7   |  24  |  0.75    Ca    9.9      23 Feb 2022 06:39  Mg     2.2     02-23            RADIOLOGY & ADDITIONAL TESTS:

## 2022-02-24 NOTE — PROGRESS NOTE ADULT - REASON FOR ADMISSION
rash and pain

## 2022-02-24 NOTE — PROGRESS NOTE ADULT - NSPROGADDITIONALINFOA_GEN_ALL_CORE
discussed pts current clinical status , management plan in detail in length with pt and pts family  all questions/ concerns addressed   [facility] code 90498  discussed management plan with acp covering pt
discussed pts current clinical status , management plan in detail in length with pt and pts family  all questions/ concerns addressed   [facility] code 93486  discussed management plan with acp covering pt
discussed pts current clinical status , management plan in detail in length with pt and pts family  all questions/ concerns addressed   [facility] code 06347  discussed management plan with acp covering pt
discussed pts current clinical status , management plan in detail in length with pt and pts family  all questions/ concerns addressed   [facility] code 92320  discussed management plan with acp covering pt

## 2022-02-24 NOTE — PROGRESS NOTE ADULT - ASSESSMENT
Favor RIME (reactive infectious mucocutaneous eruption) 2/2 recent COVID infection vs MIRM 2/2 Mycoplasma exposure given positive Mycoplasma IgM, with predominant mucosal > cutaneous involvement and with patient’s reported history of similar type of presentation with chlamydia pneumonia in the past. RIME has predominant mucosal involvement and minor skin manifestations and the latency times between symptoms of COVID-19 infection and mucosal eruptions range from 4 days to 12 weeks, with oral lesions appearing 4 to 7 days after systemic symptoms and resolving in 5 to 21 days. Differential also includes mucosal-dominant erythema multiforme. Patient now with continued significant improvement s/p 5 days of systemic steroids.     At this time:   - Recommend continuing with 40mg of po prednisone daily for the next four days   - Please c/w mupirocin ointment twice daily to AA on penis (will help prevent infection) until area is healed   - Will f/u HSV IgM and IgG (though unlikely to  at this point, would help to differentiate between EM and RIME/MIRM which may be useful in case of recurrence)   - Recommend continuing magic mouthwash  - Will have patient follow up with us in our outpatient clinic early next week     Patient can follow up with us in the [facility] located at [address] [place] [address]. [address], [place] [address] upon discharge. Our office will call to schedule an appointment but if patient does not hear from us within a few days of discharge, please instruct patient to call our office. Office phone number is [phone].    Shyam Fonseca MD  Resident Physician, PGY3  [facility]  Pager: [phone]  Office: [phone]    The patient's chart and photographs were reviewed securely with the dermatology attending Dr. Tellez  Recommendations were communicated with the primary team.  Please page [phone] for further related questions.

## 2022-02-24 NOTE — PROGRESS NOTE ADULT - SUBJECTIVE AND OBJECTIVE BOX
SUBJECTIVE / OVERNIGHT EVENTS:c/o oral pain  22    MEDICATIONS  (STANDING):  AQUAPHOR (petrolatum Ointment) 1 Application(s) Topical four times a day  enoxaparin Injectable 40 milliGRAM(s) SubCutaneous daily  erythromycin   Ointment 1 Application(s) Both EYES two times a day  lactated ringers. 1000 milliLiter(s) (125 mL/Hr) IV Continuous <Continuous>  lidocaine 2% Viscous 10 milliLiter(s) Swish and Spit every 4 hours  mupirocin 2% Ointment 1 Application(s) Topical two times a day  ofloxacin 0.3% Solution 1 Drop(s) Both EYES four times a day  prednisoLONE acetate 1% Suspension 1 Drop(s) Both EYES four times a day  predniSONE   Tablet 40 milliGRAM(s) Oral daily  senna 2 Tablet(s) Oral at bedtime    MEDICATIONS  (PRN):  acetaminophen     Tablet .. 650 milliGRAM(s) Oral every 6 hours PRN Temp greater or equal to 38C (100.4F), Mild Pain (1 - 3)  artificial tears (preservative free) Ophthalmic Solution 1 Drop(s) Both EYES every 1 hour PRN Dry Eyes  diphenhydrAMINE 25 milliGRAM(s) Oral every 6 hours PRN Rash and/or Itching  melatonin 3 milliGRAM(s) Oral at bedtime PRN Insomnia  morphine  - Injectable 2 milliGRAM(s) IV Push every 4 hours PRN Severe Pain (7 - 10)  morphine  - Injectable 1 milliGRAM(s) IV Push every 4 hours PRN Moderate Pain (4 - 6)    Vital Signs Last 24 Hrs  T(C): 36.9 (22 @ 19:53), Max: 36.9 (22 @ 12:32)  T(F): 98.4 (22 @ 19:53), Max: 98.4 (22 @ 12:32)  HR: 64 (22 @ 19:53) (64 - 73)  BP: 118/73 (22 @ 19:53) (118/73 - 158/94)  BP(mean): --  RR: 18 (22 @ 19:53) (18 - 18)  SpO2: 98% (22 @ 19:53) (96% - 98%)            Constitutional: No fever, fatigue  Skin: No rash.  Eyes: No recent vision problems or eye pain.  ENT: No congestion, ear pain, or sore throat.  Cardiovascular: No chest pain or palpation.  Respiratory: No cough, shortness of breath, congestion, or wheezing.  Gastrointestinal: No abdominal pain, nausea, vomiting, or diarrhea.  Genitourinary: No dysuria.  Musculoskeletal: No joint swelling.  Neurologic: No headache.    PHYSICAL EXAM:  GENERAL: NAD  EYES: EOMI, PERRLA, erythematous sclera+  oral lesions+  NECK: Supple, No JVD  CHEST/LUNG: cta chacorta  HEART:  S1 , S2 +  ABDOMEN: soft , bs+  EXTREMITIES:  no edema  NEUROLOGY:alert awake oriented    LABS:      139  |  101  |  8   ----------------------------<  98  3.7   |  24  |  0.75    Ca    9.9      2022 06:39  Mg     2.2           Creatinine Trend: 0.75 <--, 0.91 <--, 0.85 <--, 0.90 <--, 1.04 <--                        13.8   8.40  )-----------( 376      ( 2022 06:39 )             40.5     Urine Studies:  Urinalysis Basic - ( 2022 20:57 )    Color: Yellow / Appearance: Clear / S.032 / pH:   Gluc:  / Ketone: Moderate  / Bili: Negative / Urobili: Negative   Blood:  / Protein: 30 mg/dL / Nitrite: Negative   Leuk Esterase: Negative / RBC: 0 /hpf / WBC 4 /HPF   Sq Epi:  / Non Sq Epi: 2 /hpf / Bacteria: Negative                                  PTT - ( 2022 02:51 )  PTT:29.4 sec  Care Discussed with Consultants/Other Providers:

## 2022-02-24 NOTE — PROGRESS NOTE ADULT - PROVIDER SPECIALTY LIST ADULT
Dermatology
Dermatology
Infectious Disease
Infectious Disease
Internal Medicine
Ophthalmology
Dermatology
Dermatology
Infectious Disease
Internal Medicine
Ophthalmology
Ophthalmology
You can access the FollowMyHealth Patient Portal offered by [facility] by registering at the following website: [email or web address]. By joining [employer]’s FollowMyHealth portal, you will also be able to view your health information using other applications (apps) compatible with our system.
Internal Medicine

## 2022-02-25 ENCOUNTER — TRANSCRIPTION ENCOUNTER (OUTPATIENT)
Age: 27
End: 2022-02-25

## 2022-02-25 VITALS
TEMPERATURE: 98 F | DIASTOLIC BLOOD PRESSURE: 71 MMHG | SYSTOLIC BLOOD PRESSURE: 128 MMHG | OXYGEN SATURATION: 98 % | HEART RATE: 51 BPM | RESPIRATION RATE: 18 BRPM

## 2022-02-25 PROCEDURE — 87491 CHLMYD TRACH DNA AMP PROBE: CPT

## 2022-02-25 PROCEDURE — 80048 BASIC METABOLIC PNL TOTAL CA: CPT

## 2022-02-25 PROCEDURE — 86695 HERPES SIMPLEX TYPE 1 TEST: CPT

## 2022-02-25 PROCEDURE — 87798 DETECT AGENT NOS DNA AMP: CPT

## 2022-02-25 PROCEDURE — 86738 MYCOPLASMA ANTIBODY: CPT

## 2022-02-25 PROCEDURE — 0225U NFCT DS DNA&RNA 21 SARSCOV2: CPT

## 2022-02-25 PROCEDURE — 84100 ASSAY OF PHOSPHORUS: CPT

## 2022-02-25 PROCEDURE — 85025 COMPLETE CBC W/AUTO DIFF WBC: CPT

## 2022-02-25 PROCEDURE — 71046 X-RAY EXAM CHEST 2 VIEWS: CPT

## 2022-02-25 PROCEDURE — 99285 EMERGENCY DEPT VISIT HI MDM: CPT | Mod: 25

## 2022-02-25 PROCEDURE — 96375 TX/PRO/DX INJ NEW DRUG ADDON: CPT

## 2022-02-25 PROCEDURE — 83605 ASSAY OF LACTIC ACID: CPT

## 2022-02-25 PROCEDURE — 86664 EPSTEIN-BARR NUCLEAR ANTIGEN: CPT

## 2022-02-25 PROCEDURE — 36415 COLL VENOUS BLD VENIPUNCTURE: CPT

## 2022-02-25 PROCEDURE — 96374 THER/PROPH/DIAG INJ IV PUSH: CPT

## 2022-02-25 PROCEDURE — 87529 HSV DNA AMP PROBE: CPT

## 2022-02-25 PROCEDURE — 86780 TREPONEMA PALLIDUM: CPT

## 2022-02-25 PROCEDURE — 96361 HYDRATE IV INFUSION ADD-ON: CPT

## 2022-02-25 PROCEDURE — 86665 EPSTEIN-BARR CAPSID VCA: CPT

## 2022-02-25 PROCEDURE — 85730 THROMBOPLASTIN TIME PARTIAL: CPT

## 2022-02-25 PROCEDURE — 80053 COMPREHEN METABOLIC PANEL: CPT

## 2022-02-25 PROCEDURE — 81001 URINALYSIS AUTO W/SCOPE: CPT

## 2022-02-25 PROCEDURE — 85027 COMPLETE CBC AUTOMATED: CPT

## 2022-02-25 PROCEDURE — 87591 N.GONORRHOEAE DNA AMP PROB: CPT

## 2022-02-25 PROCEDURE — 86703 HIV-1/HIV-2 1 RESULT ANTBDY: CPT

## 2022-02-25 PROCEDURE — 83735 ASSAY OF MAGNESIUM: CPT

## 2022-02-25 PROCEDURE — 86663 EPSTEIN-BARR ANTIBODY: CPT

## 2022-02-25 PROCEDURE — 86696 HERPES SIMPLEX TYPE 2 TEST: CPT

## 2022-02-25 RX ORDER — PETROLATUM,WHITE
1 JELLY (GRAM) TOPICAL
Qty: 0 | Refills: 0 | DISCHARGE
Start: 2022-02-25

## 2022-02-25 RX ORDER — FAMOTIDINE 10 MG/ML
1 INJECTION INTRAVENOUS
Qty: 14 | Refills: 0
Start: 2022-02-25 | End: 2022-03-10

## 2022-02-25 RX ORDER — PETROLATUM,WHITE
1 JELLY (GRAM) TOPICAL
Qty: 45 | Refills: 0
Start: 2022-02-25

## 2022-02-25 RX ORDER — PREDNISOLONE SODIUM PHOSPHATE 1 %
1 DROPS OPHTHALMIC (EYE)
Qty: 1 | Refills: 0
Start: 2022-02-25 | End: 2022-03-03

## 2022-02-25 RX ORDER — ERYTHROMYCIN BASE 5 MG/GRAM
1 OINTMENT (GRAM) OPHTHALMIC (EYE)
Qty: 45 | Refills: 0
Start: 2022-02-25 | End: 2022-03-03

## 2022-02-25 RX ORDER — ERYTHROMYCIN BASE 5 MG/GRAM
1 OINTMENT (GRAM) OPHTHALMIC (EYE)
Qty: 0 | Refills: 0 | DISCHARGE
Start: 2022-02-25

## 2022-02-25 RX ORDER — LIDOCAINE 4 G/100G
10 CREAM TOPICAL
Qty: 840 | Refills: 0
Start: 2022-02-25 | End: 2022-03-10

## 2022-02-25 RX ORDER — OFLOXACIN 200 MG
1 TABLET ORAL
Qty: 1 | Refills: 0
Start: 2022-02-25 | End: 2022-03-03

## 2022-02-25 RX ORDER — PREDNISOLONE SODIUM PHOSPHATE 1 %
1 DROPS OPHTHALMIC (EYE)
Qty: 0 | Refills: 0 | DISCHARGE
Start: 2022-02-25

## 2022-02-25 RX ORDER — OFLOXACIN 200 MG
1 TABLET ORAL
Qty: 0 | Refills: 0 | DISCHARGE
Start: 2022-02-25

## 2022-02-25 RX ORDER — MUPIROCIN 20 MG/G
1 OINTMENT TOPICAL
Qty: 45 | Refills: 0
Start: 2022-02-25 | End: 2022-03-10

## 2022-02-25 RX ORDER — ACETAMINOPHEN 500 MG
2 TABLET ORAL
Qty: 0 | Refills: 0 | DISCHARGE
Start: 2022-02-25

## 2022-02-25 RX ORDER — MUPIROCIN 20 MG/G
1 OINTMENT TOPICAL
Qty: 0 | Refills: 0 | DISCHARGE
Start: 2022-02-25

## 2022-02-25 RX ORDER — DIPHENHYDRAMINE HCL 50 MG
1 CAPSULE ORAL
Qty: 0 | Refills: 0 | DISCHARGE
Start: 2022-02-25

## 2022-02-25 RX ORDER — LIDOCAINE 4 G/100G
10 CREAM TOPICAL
Qty: 0 | Refills: 0 | DISCHARGE
Start: 2022-02-25

## 2022-02-25 RX ORDER — LIDOCAINE 4 G/100G
10 CREAM TOPICAL
Qty: 420 | Refills: 0
Start: 2022-02-25 | End: 2022-03-03

## 2022-02-25 RX ORDER — SENNA PLUS 8.6 MG/1
2 TABLET ORAL
Qty: 0 | Refills: 0 | DISCHARGE
Start: 2022-02-25

## 2022-02-25 RX ADMIN — LIDOCAINE 10 MILLILITER(S): 4 CREAM TOPICAL at 13:25

## 2022-02-25 RX ADMIN — ENOXAPARIN SODIUM 40 MILLIGRAM(S): 100 INJECTION SUBCUTANEOUS at 13:26

## 2022-02-25 RX ADMIN — MUPIROCIN 1 APPLICATION(S): 20 OINTMENT TOPICAL at 05:02

## 2022-02-25 RX ADMIN — Medication 1 DROP(S): at 13:20

## 2022-02-25 RX ADMIN — LIDOCAINE 10 MILLILITER(S): 4 CREAM TOPICAL at 09:52

## 2022-02-25 RX ADMIN — Medication 1 APPLICATION(S): at 05:02

## 2022-02-25 RX ADMIN — LIDOCAINE 10 MILLILITER(S): 4 CREAM TOPICAL at 03:11

## 2022-02-25 RX ADMIN — LIDOCAINE 10 MILLILITER(S): 4 CREAM TOPICAL at 05:02

## 2022-02-25 RX ADMIN — Medication 1 DROP(S): at 13:21

## 2022-02-25 RX ADMIN — Medication 650 MILLIGRAM(S): at 05:37

## 2022-02-25 RX ADMIN — Medication 1 APPLICATION(S): at 13:20

## 2022-02-25 RX ADMIN — Medication 650 MILLIGRAM(S): at 10:09

## 2022-02-25 RX ADMIN — Medication 650 MILLIGRAM(S): at 05:07

## 2022-02-25 RX ADMIN — Medication 1 DROP(S): at 05:03

## 2022-02-25 NOTE — DISCHARGE NOTE PROVIDER - HOSPITAL COURSE
27 yo M recent COVID19 infection 2 weeks prior and no PMH p/w 5 days of rash with oral involvement.    Problem/Plan - 1:  ·  Problem: Rash.   ·  Plan: DDx: RIME (reactive infectious mucocutaneous eruption) 2/2 recent COVID infection vs Erythema multiforme 2/2 HSV given target-like lesions vs less likely SJS  - HSV PCR -, Mycoplasma IgM/IgG +, COVID PCR + 2/8/2022 per patient  derm / ID/ Ophthal input appreciated  cont steroids  no abx as per ID   pain control  improving clinically.

## 2022-02-25 NOTE — DISCHARGE NOTE PROVIDER - NSFOLLOWUPCLINICS_GEN_ALL_ED_FT
[facility] - [facility]  [address], [address]  [place] [address]  Phone: [phone]  Fax: [phone]  Scheduled Appointment: 3/4/2022 1:00 PM

## 2022-02-25 NOTE — DISCHARGE NOTE PROVIDER - NSDCMRMEDTOKEN_GEN_ALL_CORE_FT
acetaminophen 325 mg oral tablet: 2-3 tab(s) orally every 6 hours, As needed, for moderate and severe pain (4-10)  diphenhydrAMINE 25 mg oral capsule: 1 cap(s) orally every 6 hours, As needed, for Itching  senna oral tablet: 2 tab(s) orally once a day (at bedtime)   acetaminophen 325 mg oral tablet: 2-3 tab(s) orally every 6 hours, As needed, for moderate and severe pain (4-10)  diphenhydrAMINE 25 mg oral capsule: 1 cap(s) orally every 6 hours, As needed, for Itching  erythromycin 0.5% ophthalmic ointment: 1 application to each affected eye 2 times a day  lidocaine 2% mucous membrane solution: 10 milliliter(s) mucous membrane every 4 hours. Swish &amp; spit  mupirocin 2% topical ointment: 1 application topically 2 times a day to penile rash  ocular lubricant ophthalmic solution: 1 drop(s) to each affected eye every hour, As needed, Dry Eyes  ofloxacin 0.3% ophthalmic solution: 1 drop(s) to each affected eye 4 times a day  petrolatum topical ointment: 1 application topically 4 times a day to rash  prednisoLONE acetate 1% ophthalmic suspension: 1 drop(s) to each affected eye 4 times a day  predniSONE 20 mg oral tablet: 2 tab(s) orally once a day x4 days  senna oral tablet: 2 tab(s) orally once a day (at bedtime)   acetaminophen 325 mg oral tablet: 2-3 tab(s) orally every 6 hours, As needed, for moderate and severe pain (4-10)  diphenhydrAMINE 25 mg oral capsule: 1 cap(s) orally every 6 hours, As needed, for Itching  erythromycin 0.5% ophthalmic ointment: 1 application to each affected eye 2 times a day  lidocaine 2% mucous membrane solution: 10 milliliter(s) mucous membrane every 4 hours. Swish &amp; spit  mupirocin 2% topical ointment: 1 application topically 2 times a day to penile rash  ocular lubricant ophthalmic solution: 1 drop(s) to each affected eye every hour, As needed, Dry Eyes  ofloxacin 0.3% ophthalmic solution: 1 drop(s) to each affected eye 4 times a day  Pepcid 20 mg oral tablet: 1 tab(s) orally once a day  petrolatum topical ointment: 1 application topically 4 times a day to rash  prednisoLONE acetate 1% ophthalmic suspension: 1 drop(s) to each affected eye 4 times a day  predniSONE 20 mg oral tablet: 2 tab(s) orally once a day x4 days  senna oral tablet: 2 tab(s) orally once a day (at bedtime)

## 2022-02-25 NOTE — DISCHARGE NOTE NURSING/CASE MANAGEMENT/SOCIAL WORK - PATIENT PORTAL LINK FT
You can access the FollowMyHealth Patient Portal offered by [facility] by registering at the following website: [email or web address]. By joining [employer]’s FollowMyHealth portal, you will also be able to view your health information using other applications (apps) compatible with our system.

## 2022-02-25 NOTE — DISCHARGE NOTE PROVIDER - CARE PROVIDER_API CALL
GARRY GABBIE  Internal Medicine  [address] [place] [address]  Phone: [phone]  Fax: [phone]  Follow Up Time:

## 2022-02-25 NOTE — DISCHARGE NOTE NURSING/CASE MANAGEMENT/SOCIAL WORK - NSDCPEFALRISK_GEN_ALL_CORE
For information on Fall & Injury Prevention, visit: [email or web address] OR  [email or web address] OR  https://www.cdc.gov/steadi/patient.html

## 2022-02-25 NOTE — DISCHARGE NOTE PROVIDER - NSDCCPCAREPLAN_GEN_ALL_CORE_FT
PRINCIPAL DISCHARGE DIAGNOSIS  Diagnosis: Mucositis oral  Assessment and Plan of Treatment: Continue with treatment as prescribed.  Follow up with Dermatology in 1 week. Appt made for 3/4 @1pm in [facility].      SECONDARY DISCHARGE DIAGNOSES  Diagnosis: Rash  Assessment and Plan of Treatment: Continue with treatment as prescribed.  Follow up with Dermatology as instructed above.    Diagnosis: Odynophagia  Assessment and Plan of Treatment: Continue with treamtment as prescribed.  Follow up with your primary doctor if you continue to have painful swallowing as you may need to follow up with an ENT doctor.

## 2022-02-26 ENCOUNTER — NON-APPOINTMENT (OUTPATIENT)
Age: 27
End: 2022-02-26

## 2022-02-28 LAB
HSV1 AB FLD QL: NEGATIVE — SIGNIFICANT CHANGE UP
HSV2 AB FLD-ACNC: NEGATIVE — SIGNIFICANT CHANGE UP

## 2022-03-02 ENCOUNTER — APPOINTMENT (OUTPATIENT)
Dept: DERMATOLOGY | Facility: CLINIC | Age: 27
End: 2022-03-02
Payer: COMMERCIAL

## 2022-03-02 VITALS — BODY MASS INDEX: 24.38 KG/M2 | HEIGHT: 74 IN | WEIGHT: 190 LBS

## 2022-03-02 DIAGNOSIS — N48.9 DISORDER OF PENIS, UNSPECIFIED: ICD-10-CM

## 2022-03-02 PROCEDURE — 99215 OFFICE O/P EST HI 40 MIN: CPT

## 2022-03-02 NOTE — PHYSICAL EXAM
[FreeTextEntry3] : AAOx3, pleasant, NAD, no visual lymphadenopathy\par hair, scalp, face, nose, eyelids, ears, lips, oropharynx, neck, chest, abdomen, back, right arm, left arm, nails, and hands examined with all normal findings,\par pertinent findings include:\par \par penis with crusitng, oozing and erythema at corona

## 2022-03-02 NOTE — HISTORY OF PRESENT ILLNESS
[de-identified] : 26 year old female here for hospital follow up. Patient was improving after steroids in hospital; eyes and mouth were improved but penis causing significant pain. \par \par hospital hx below:\par Favor RIME (reactive infectious mucocutaneous eruption) 2/2 recent COVID\par infection vs MIRM 2/2 Mycoplasma exposure given positive Mycoplasma IgM, with\par predominant mucosal > cutaneous involvement and with patients reported history\par of similar type of presentation with chlamydia pneumonia in the past. RIME has\par predominant mucosal involvement and minor skin manifestations and the latency\par times between symptoms of COVID-19 infection and mucosal eruptions range from 4\par days to 12 weeks, with oral lesions appearing 4 to 7 days after systemic\par symptoms and resolving in 5 to 21 days. Differential also includes\par mucosal-dominant erythema multiforme. [FreeTextEntry1] : rash

## 2022-03-02 NOTE — ASSESSMENT
[Use of independent historian: [ enter independent historian's relationship to patient ] :____] : As the patient was unable to provide a complete and reliable history, I obtained clinical history from the patient’s [unfilled] [External notes review: [ enter provider(s) name(s) ] :____] : As part of my evaluation, I have reviewed prior clinical note(s) from provider(s) outside of my group practice. The name(s) are: [unfilled] [Review of test: [ enter lab tests ] :____] : I reviewed the following test results: [unfilled] [FreeTextEntry1] : likely reactive infectious mucocutaneous eruption\par flaring after discharge from hospital\par Prednisone taper at 60 mg x 1 week, 40 x 1 week, 20 x1 week; SED\par TAC BID PRN; SED\par \par f/u 1-2 weeks to monitor for improvement

## 2022-03-02 NOTE — HISTORY OF PRESENT ILLNESS
[de-identified] : 26 year old female here for hospital follow up. Patient was improving after steroids in hospital; eyes and mouth were improved but penis causing significant pain. \par \par hospital hx below:\par Favor RIME (reactive infectious mucocutaneous eruption) 2/2 recent COVID\par infection vs MIRM 2/2 Mycoplasma exposure given positive Mycoplasma IgM, with\par predominant mucosal > cutaneous involvement and with patients reported history\par of similar type of presentation with chlamydia pneumonia in the past. RIME has\par predominant mucosal involvement and minor skin manifestations and the latency\par times between symptoms of COVID-19 infection and mucosal eruptions range from 4\par days to 12 weeks, with oral lesions appearing 4 to 7 days after systemic\par symptoms and resolving in 5 to 21 days. Differential also includes\par mucosal-dominant erythema multiforme. [FreeTextEntry1] : rash

## 2022-03-08 ENCOUNTER — APPOINTMENT (OUTPATIENT)
Dept: OPHTHALMOLOGY | Facility: CLINIC | Age: 27
End: 2022-03-08

## 2022-03-16 ENCOUNTER — APPOINTMENT (OUTPATIENT)
Dept: DERMATOLOGY | Facility: CLINIC | Age: 27
End: 2022-03-16

## 2022-05-09 ENCOUNTER — APPOINTMENT (OUTPATIENT)
Dept: DERMATOLOGY | Facility: CLINIC | Age: 27
End: 2022-05-09
Payer: COMMERCIAL

## 2022-05-09 VITALS — HEIGHT: 74 IN | WEIGHT: 190 LBS | BODY MASS INDEX: 24.38 KG/M2

## 2022-05-09 DIAGNOSIS — R21 RASH AND OTHER NONSPECIFIC SKIN ERUPTION: ICD-10-CM

## 2022-05-09 PROCEDURE — 99213 OFFICE O/P EST LOW 20 MIN: CPT

## 2022-05-09 RX ORDER — VALACYCLOVIR 1 G/1
1 TABLET, FILM COATED ORAL
Qty: 30 | Refills: 0 | Status: ACTIVE | COMMUNITY
Start: 2022-05-09 | End: 1900-01-01

## 2022-05-09 RX ORDER — TRIAMCINOLONE ACETONIDE 1 MG/G
0.1 OINTMENT TOPICAL
Qty: 1 | Refills: 3 | Status: DISCONTINUED | COMMUNITY
Start: 2022-03-02 | End: 2022-05-09

## 2022-05-09 RX ORDER — PREDNISONE 20 MG/1
20 TABLET ORAL
Qty: 42 | Refills: 0 | Status: DISCONTINUED | COMMUNITY
Start: 2022-03-02 | End: 2022-05-09

## 2022-08-15 NOTE — ED PROVIDER NOTE - CONSTITUTIONAL MENTATION
Refill Request     CONFIRM preferrred pharmacy with the patient. If Mail Order Rx - Pend for 90 day refill.       Last Seen: Last Seen Department: 3/25/2022  Last Seen by PCP: 12/27/2021    Last Written: 12/27/2021 90 tablet 1 refill    Next Appointment:   Future Appointments   Date Time Provider [place]   12/30/2022  9:30 AM Jeanie Molina MD [facility] [place] - [facility]       Future appointment scheduled      Requested Prescriptions     Pending Prescriptions Disp Refills    levothyroxine (SYNTHROID) 25 MCG tablet [Pharmacy Med Name: LEVOTHYROXIN TAB 25MCG] 90 tablet 1     Sig: TAKE 1 TABLET DAILY
oriented to person, place, time/situation

## 2024-02-21 ENCOUNTER — APPOINTMENT (OUTPATIENT)
Age: 29
End: 2024-02-21
Payer: COMMERCIAL

## 2024-02-21 PROCEDURE — 99204 OFFICE O/P NEW MOD 45 MIN: CPT

## 2024-03-06 ENCOUNTER — TRANSCRIPTION ENCOUNTER (OUTPATIENT)
Age: 29
End: 2024-03-06

## 2024-07-01 ENCOUNTER — APPOINTMENT (OUTPATIENT)
Dept: ORTHOPEDIC SURGERY | Facility: CLINIC | Age: 29
End: 2024-07-01
Payer: COMMERCIAL

## 2024-07-01 VITALS — WEIGHT: 196 LBS | BODY MASS INDEX: 25.98 KG/M2 | HEIGHT: 73 IN

## 2024-07-01 DIAGNOSIS — M67.432 GANGLION, LEFT WRIST: ICD-10-CM

## 2024-07-01 PROCEDURE — 99203 OFFICE O/P NEW LOW 30 MIN: CPT

## 2024-07-01 RX ORDER — METHYLPREDNISOLONE 4 MG/1
4 TABLET ORAL
Qty: 1 | Refills: 0 | Status: ACTIVE | COMMUNITY
Start: 2024-07-01 | End: 1900-01-01

## 2024-11-14 NOTE — DISCHARGE NOTE NURSING/CASE MANAGEMENT/SOCIAL WORK - NSDCPETBCESMAN_GEN_ALL_CORE
Medication: Metoprolol passed protocol.   Last office visit date: 10/30/2024  Next appointment scheduled?: Yes   Number of refills given: 3   If you are a smoker, it is important for your health to stop smoking. Please be aware that second hand smoke is also harmful.

## 2024-11-29 ENCOUNTER — APPOINTMENT (OUTPATIENT)
Dept: ORTHOPEDIC SURGERY | Facility: CLINIC | Age: 29
End: 2024-11-29
Payer: COMMERCIAL

## 2024-11-29 VITALS — BODY MASS INDEX: 25.98 KG/M2 | HEIGHT: 73 IN | WEIGHT: 196 LBS

## 2024-11-29 DIAGNOSIS — M23.92 UNSPECIFIED INTERNAL DERANGEMENT OF LEFT KNEE: ICD-10-CM

## 2024-11-29 PROCEDURE — 99203 OFFICE O/P NEW LOW 30 MIN: CPT

## 2024-11-29 PROCEDURE — 73562 X-RAY EXAM OF KNEE 3: CPT | Mod: LT

## 2024-11-30 ENCOUNTER — APPOINTMENT (OUTPATIENT)
Dept: MRI IMAGING | Facility: CLINIC | Age: 29
End: 2024-11-30

## 2024-11-30 PROCEDURE — 73721 MRI JNT OF LWR EXTRE W/O DYE: CPT | Mod: LT

## 2024-12-02 ENCOUNTER — APPOINTMENT (OUTPATIENT)
Dept: ORTHOPEDIC SURGERY | Facility: CLINIC | Age: 29
End: 2024-12-02
Payer: COMMERCIAL

## 2024-12-02 VITALS — WEIGHT: 196 LBS | HEIGHT: 73 IN | BODY MASS INDEX: 25.98 KG/M2

## 2024-12-02 DIAGNOSIS — Z78.9 OTHER SPECIFIED HEALTH STATUS: ICD-10-CM

## 2024-12-02 DIAGNOSIS — S72.415A NONDISPLACED UNSPECIFIED CONDYLE FRACTURE OF LOWER END OF LEFT FEMUR, INITIAL ENCOUNTER FOR CLOSED FRACTURE: ICD-10-CM

## 2024-12-02 DIAGNOSIS — M23.92 UNSPECIFIED INTERNAL DERANGEMENT OF LEFT KNEE: ICD-10-CM

## 2024-12-02 DIAGNOSIS — M19.012 PRIMARY OSTEOARTHRITIS, LEFT SHOULDER: ICD-10-CM

## 2024-12-02 PROCEDURE — 99204 OFFICE O/P NEW MOD 45 MIN: CPT

## 2024-12-02 RX ORDER — MELOXICAM 15 MG/1
15 TABLET ORAL
Qty: 30 | Refills: 1 | Status: ACTIVE | COMMUNITY
Start: 2024-12-02 | End: 1900-01-01

## 2024-12-10 PROBLEM — S72.415A CLOSED NONDISPLACED FRACTURE OF CONDYLE OF LEFT FEMUR, INITIAL ENCOUNTER: Status: ACTIVE | Noted: 2024-12-10

## 2024-12-10 PROBLEM — M19.012 ARTHRITIS OF LEFT ACROMIOCLAVICULAR JOINT: Status: ACTIVE | Noted: 2024-12-10

## 2025-03-24 ENCOUNTER — APPOINTMENT (OUTPATIENT)
Dept: ORTHOPEDIC SURGERY | Facility: CLINIC | Age: 30
End: 2025-03-24

## 2025-08-09 ENCOUNTER — INPATIENT (INPATIENT)
Facility: HOSPITAL | Age: 30
LOS: 2 days | Discharge: HOME | DRG: 159 | End: 2025-08-12
Attending: INTERNAL MEDICINE | Admitting: INTERNAL MEDICINE
Payer: COMMERCIAL

## 2025-08-09 VITALS
RESPIRATION RATE: 20 BRPM | DIASTOLIC BLOOD PRESSURE: 89 MMHG | TEMPERATURE: 98 F | HEIGHT: 73 IN | HEART RATE: 75 BPM | WEIGHT: 210.1 LBS | SYSTOLIC BLOOD PRESSURE: 138 MMHG | OXYGEN SATURATION: 99 %

## 2025-08-09 DIAGNOSIS — Z98.89 OTHER SPECIFIED POSTPROCEDURAL STATES: Chronic | ICD-10-CM

## 2025-08-09 LAB
ALBUMIN SERPL ELPH-MCNC: 4.7 G/DL — SIGNIFICANT CHANGE UP (ref 3.3–5)
ALP SERPL-CCNC: 60 U/L — SIGNIFICANT CHANGE UP (ref 40–120)
ALT FLD-CCNC: 21 U/L — SIGNIFICANT CHANGE UP (ref 10–45)
ANION GAP SERPL CALC-SCNC: 15 MMOL/L — SIGNIFICANT CHANGE UP (ref 5–17)
AST SERPL-CCNC: 19 U/L — SIGNIFICANT CHANGE UP (ref 10–40)
BASOPHILS # BLD AUTO: 0.03 K/UL — SIGNIFICANT CHANGE UP (ref 0–0.2)
BASOPHILS NFR BLD AUTO: 0.4 % — SIGNIFICANT CHANGE UP (ref 0–2)
BILIRUB SERPL-MCNC: 0.4 MG/DL — SIGNIFICANT CHANGE UP (ref 0.2–1.2)
BUN SERPL-MCNC: 15 MG/DL — SIGNIFICANT CHANGE UP (ref 7–23)
CALCIUM SERPL-MCNC: 10.3 MG/DL — SIGNIFICANT CHANGE UP (ref 8.4–10.5)
CHLORIDE SERPL-SCNC: 102 MMOL/L — SIGNIFICANT CHANGE UP (ref 96–108)
CO2 SERPL-SCNC: 24 MMOL/L — SIGNIFICANT CHANGE UP (ref 22–31)
CREAT SERPL-MCNC: 1.01 MG/DL — SIGNIFICANT CHANGE UP (ref 0.5–1.3)
CRP SERPL-MCNC: 9 MG/L — HIGH (ref 0–4)
EGFR: 104 ML/MIN/1.73M2 — SIGNIFICANT CHANGE UP
EGFR: 104 ML/MIN/1.73M2 — SIGNIFICANT CHANGE UP
EOSINOPHIL # BLD AUTO: 0.14 K/UL — SIGNIFICANT CHANGE UP (ref 0–0.5)
EOSINOPHIL NFR BLD AUTO: 2 % — SIGNIFICANT CHANGE UP (ref 0–6)
ERYTHROCYTE [SEDIMENTATION RATE] IN BLOOD: 48 MM/HR — HIGH (ref 0–15)
FLUAV AG NPH QL: SIGNIFICANT CHANGE UP
FLUBV AG NPH QL: SIGNIFICANT CHANGE UP
GAS PNL BLDV: SIGNIFICANT CHANGE UP
GLUCOSE SERPL-MCNC: 92 MG/DL — SIGNIFICANT CHANGE UP (ref 70–99)
HCT VFR BLD CALC: 41.3 % — SIGNIFICANT CHANGE UP (ref 39–50)
HGB BLD-MCNC: 14.1 G/DL — SIGNIFICANT CHANGE UP (ref 13–17)
IMM GRANULOCYTES # BLD AUTO: 0.02 K/UL — SIGNIFICANT CHANGE UP (ref 0–0.07)
IMM GRANULOCYTES NFR BLD AUTO: 0.3 % — SIGNIFICANT CHANGE UP (ref 0–0.9)
LYMPHOCYTES # BLD AUTO: 2.22 K/UL — SIGNIFICANT CHANGE UP (ref 1–3.3)
LYMPHOCYTES NFR BLD AUTO: 31.6 % — SIGNIFICANT CHANGE UP (ref 13–44)
MCHC RBC-ENTMCNC: 29.4 PG — SIGNIFICANT CHANGE UP (ref 27–34)
MCHC RBC-ENTMCNC: 34.1 G/DL — SIGNIFICANT CHANGE UP (ref 32–36)
MCV RBC AUTO: 86 FL — SIGNIFICANT CHANGE UP (ref 80–100)
MONOCYTES # BLD AUTO: 0.48 K/UL — SIGNIFICANT CHANGE UP (ref 0–0.9)
MONOCYTES NFR BLD AUTO: 6.8 % — SIGNIFICANT CHANGE UP (ref 2–14)
NEUTROPHILS # BLD AUTO: 4.14 K/UL — SIGNIFICANT CHANGE UP (ref 1.8–7.4)
NEUTROPHILS NFR BLD AUTO: 58.9 % — SIGNIFICANT CHANGE UP (ref 43–77)
NRBC # BLD AUTO: 0 K/UL — SIGNIFICANT CHANGE UP (ref 0–0)
NRBC # FLD: 0 K/UL — SIGNIFICANT CHANGE UP (ref 0–0)
NRBC BLD AUTO-RTO: 0 /100 WBCS — SIGNIFICANT CHANGE UP (ref 0–0)
PLATELET # BLD AUTO: 236 K/UL — SIGNIFICANT CHANGE UP (ref 150–400)
PMV BLD: 8.5 FL — SIGNIFICANT CHANGE UP (ref 7–13)
POTASSIUM SERPL-MCNC: 4.1 MMOL/L — SIGNIFICANT CHANGE UP (ref 3.5–5.3)
POTASSIUM SERPL-SCNC: 4.1 MMOL/L — SIGNIFICANT CHANGE UP (ref 3.5–5.3)
PROT SERPL-MCNC: 8 G/DL — SIGNIFICANT CHANGE UP (ref 6–8.3)
RBC # BLD: 4.8 M/UL — SIGNIFICANT CHANGE UP (ref 4.2–5.8)
RBC # FLD: 11.5 % — SIGNIFICANT CHANGE UP (ref 10.3–14.5)
RSV RNA NPH QL NAA+NON-PROBE: SIGNIFICANT CHANGE UP
SARS-COV-2 RNA SPEC QL NAA+PROBE: SIGNIFICANT CHANGE UP
SODIUM SERPL-SCNC: 141 MMOL/L — SIGNIFICANT CHANGE UP (ref 135–145)
SOURCE RESPIRATORY: SIGNIFICANT CHANGE UP
WBC # BLD: 7.03 K/UL — SIGNIFICANT CHANGE UP (ref 3.8–10.5)
WBC # FLD AUTO: 7.03 K/UL — SIGNIFICANT CHANGE UP (ref 3.8–10.5)

## 2025-08-09 PROCEDURE — 99223 1ST HOSP IP/OBS HIGH 75: CPT

## 2025-08-09 RX ORDER — KETOROLAC TROMETHAMINE 30 MG/ML
30 INJECTION, SOLUTION INTRAMUSCULAR; INTRAVENOUS ONCE
Refills: 0 | Status: DISCONTINUED | OUTPATIENT
Start: 2025-08-09 | End: 2025-08-09

## 2025-08-09 RX ORDER — KETOROLAC TROMETHAMINE 30 MG/ML
15 INJECTION, SOLUTION INTRAMUSCULAR; INTRAVENOUS EVERY 6 HOURS
Refills: 0 | Status: COMPLETED | OUTPATIENT
Start: 2025-08-09 | End: 2025-08-14

## 2025-08-09 RX ORDER — KETOROLAC TROMETHAMINE 30 MG/ML
15 INJECTION, SOLUTION INTRAMUSCULAR; INTRAVENOUS EVERY 6 HOURS
Refills: 0 | Status: DISCONTINUED | OUTPATIENT
Start: 2025-08-09 | End: 2025-08-12

## 2025-08-09 RX ORDER — KETOROLAC TROMETHAMINE 30 MG/ML
15 INJECTION, SOLUTION INTRAMUSCULAR; INTRAVENOUS ONCE
Refills: 0 | Status: DISCONTINUED | OUTPATIENT
Start: 2025-08-09 | End: 2025-08-09

## 2025-08-09 RX ORDER — METHYLPREDNISOLONE ACETATE 80 MG/ML
60 INJECTION, SUSPENSION INTRA-ARTICULAR; INTRALESIONAL; INTRAMUSCULAR; SOFT TISSUE ONCE
Refills: 0 | Status: COMPLETED | OUTPATIENT
Start: 2025-08-09 | End: 2025-08-09

## 2025-08-09 RX ORDER — ERYTHROMYCIN 5 MG/G
1 OINTMENT OPHTHALMIC AT BEDTIME
Refills: 0 | Status: DISCONTINUED | OUTPATIENT
Start: 2025-08-09 | End: 2025-08-12

## 2025-08-09 RX ORDER — ACETAMINOPHEN 500 MG/5ML
1000 LIQUID (ML) ORAL EVERY 6 HOURS
Refills: 0 | Status: COMPLETED | OUTPATIENT
Start: 2025-08-09 | End: 2025-08-10

## 2025-08-09 RX ORDER — ACETAMINOPHEN 500 MG/5ML
1000 LIQUID (ML) ORAL ONCE
Refills: 0 | Status: COMPLETED | OUTPATIENT
Start: 2025-08-09 | End: 2025-08-09

## 2025-08-09 RX ADMIN — KETOROLAC TROMETHAMINE 30 MILLIGRAM(S): 30 INJECTION, SOLUTION INTRAMUSCULAR; INTRAVENOUS at 16:41

## 2025-08-09 RX ADMIN — METHYLPREDNISOLONE ACETATE 60 MILLIGRAM(S): 80 INJECTION, SUSPENSION INTRA-ARTICULAR; INTRALESIONAL; INTRAMUSCULAR; SOFT TISSUE at 21:55

## 2025-08-09 RX ADMIN — ERYTHROMYCIN 1 APPLICATION(S): 5 OINTMENT OPHTHALMIC at 22:42

## 2025-08-09 RX ADMIN — KETOROLAC TROMETHAMINE 15 MILLIGRAM(S): 30 INJECTION, SOLUTION INTRAMUSCULAR; INTRAVENOUS at 22:42

## 2025-08-09 RX ADMIN — Medication 400 MILLIGRAM(S): at 21:56

## 2025-08-09 RX ADMIN — Medication 1000 MILLILITER(S): at 16:41

## 2025-08-10 DIAGNOSIS — J02.9 ACUTE PHARYNGITIS, UNSPECIFIED: ICD-10-CM

## 2025-08-10 DIAGNOSIS — J06.9 ACUTE UPPER RESPIRATORY INFECTION, UNSPECIFIED: ICD-10-CM

## 2025-08-10 DIAGNOSIS — T14.8XXA OTHER INJURY OF UNSPECIFIED BODY REGION, INITIAL ENCOUNTER: ICD-10-CM

## 2025-08-10 DIAGNOSIS — L13.8 OTHER SPECIFIED BULLOUS DISORDERS: ICD-10-CM

## 2025-08-10 DIAGNOSIS — R09.81 NASAL CONGESTION: ICD-10-CM

## 2025-08-10 DIAGNOSIS — K21.9 GASTRO-ESOPHAGEAL REFLUX DISEASE WITHOUT ESOPHAGITIS: ICD-10-CM

## 2025-08-10 PROCEDURE — 85014 HEMATOCRIT: CPT

## 2025-08-10 PROCEDURE — 85025 COMPLETE CBC W/AUTO DIFF WBC: CPT

## 2025-08-10 PROCEDURE — 86140 C-REACTIVE PROTEIN: CPT

## 2025-08-10 PROCEDURE — 82947 ASSAY GLUCOSE BLOOD QUANT: CPT

## 2025-08-10 PROCEDURE — 87637 SARSCOV2&INF A&B&RSV AMP PRB: CPT

## 2025-08-10 PROCEDURE — 99221 1ST HOSP IP/OBS SF/LOW 40: CPT

## 2025-08-10 PROCEDURE — 82435 ASSAY OF BLOOD CHLORIDE: CPT

## 2025-08-10 PROCEDURE — 85018 HEMOGLOBIN: CPT

## 2025-08-10 PROCEDURE — 82803 BLOOD GASES ANY COMBINATION: CPT

## 2025-08-10 PROCEDURE — 84295 ASSAY OF SERUM SODIUM: CPT

## 2025-08-10 PROCEDURE — 84132 ASSAY OF SERUM POTASSIUM: CPT

## 2025-08-10 PROCEDURE — 85652 RBC SED RATE AUTOMATED: CPT

## 2025-08-10 PROCEDURE — 99233 SBSQ HOSP IP/OBS HIGH 50: CPT

## 2025-08-10 PROCEDURE — 99223 1ST HOSP IP/OBS HIGH 75: CPT

## 2025-08-10 PROCEDURE — 80053 COMPREHEN METABOLIC PANEL: CPT

## 2025-08-10 PROCEDURE — 83605 ASSAY OF LACTIC ACID: CPT

## 2025-08-10 PROCEDURE — 82784 ASSAY IGA/IGD/IGG/IGM EACH: CPT

## 2025-08-10 PROCEDURE — 82330 ASSAY OF CALCIUM: CPT

## 2025-08-10 RX ORDER — IMMUNE GLOBULIN (HUMAN) 10 G/100ML
80 INJECTION INTRAVENOUS; SUBCUTANEOUS ONCE
Refills: 0 | Status: COMPLETED | OUTPATIENT
Start: 2025-08-10 | End: 2025-08-10

## 2025-08-10 RX ORDER — KETOROLAC TROMETHAMINE 30 MG/ML
15 INJECTION, SOLUTION INTRAMUSCULAR; INTRAVENOUS ONCE
Refills: 0 | Status: DISCONTINUED | OUTPATIENT
Start: 2025-08-10 | End: 2025-08-10

## 2025-08-10 RX ORDER — METHYLPREDNISOLONE ACETATE 80 MG/ML
60 INJECTION, SUSPENSION INTRA-ARTICULAR; INTRALESIONAL; INTRAMUSCULAR; SOFT TISSUE ONCE
Refills: 0 | Status: COMPLETED | OUTPATIENT
Start: 2025-08-10 | End: 2025-08-10

## 2025-08-10 RX ORDER — FLUTICASONE PROPIONATE 50 UG/1
1 SPRAY, METERED NASAL
Refills: 0 | Status: DISCONTINUED | OUTPATIENT
Start: 2025-08-10 | End: 2025-08-12

## 2025-08-10 RX ORDER — LANOLIN/MINERAL OIL/PETROLATUM
1 OINTMENT (GRAM) OPHTHALMIC (EYE)
Refills: 0 | Status: DISCONTINUED | OUTPATIENT
Start: 2025-08-10 | End: 2025-08-12

## 2025-08-10 RX ORDER — ACETAMINOPHEN 500 MG/5ML
1000 LIQUID (ML) ORAL ONCE
Refills: 0 | Status: COMPLETED | OUTPATIENT
Start: 2025-08-10 | End: 2025-08-10

## 2025-08-10 RX ORDER — PREDNISONE 20 MG/1
60 TABLET ORAL DAILY
Refills: 0 | Status: DISCONTINUED | OUTPATIENT
Start: 2025-08-11 | End: 2025-08-12

## 2025-08-10 RX ORDER — SODIUM CHLORIDE 0.65 %
2 AEROSOL, SPRAY (ML) NASAL
Refills: 0 | Status: DISCONTINUED | OUTPATIENT
Start: 2025-08-10 | End: 2025-08-12

## 2025-08-10 RX ORDER — DIPHENHYDRAMINE HCL 12.5MG/5ML
25 ELIXIR ORAL ONCE
Refills: 0 | Status: COMPLETED | OUTPATIENT
Start: 2025-08-10 | End: 2025-08-10

## 2025-08-10 RX ADMIN — KETOROLAC TROMETHAMINE 15 MILLIGRAM(S): 30 INJECTION, SOLUTION INTRAMUSCULAR; INTRAVENOUS at 08:07

## 2025-08-10 RX ADMIN — ERYTHROMYCIN 1 APPLICATION(S): 5 OINTMENT OPHTHALMIC at 23:21

## 2025-08-10 RX ADMIN — Medication 1 DROP(S): at 23:09

## 2025-08-10 RX ADMIN — Medication 25 MILLIGRAM(S): at 20:59

## 2025-08-10 RX ADMIN — Medication 400 MILLIGRAM(S): at 08:07

## 2025-08-10 RX ADMIN — Medication 2 SPRAY(S): at 23:08

## 2025-08-10 RX ADMIN — KETOROLAC TROMETHAMINE 15 MILLIGRAM(S): 30 INJECTION, SOLUTION INTRAMUSCULAR; INTRAVENOUS at 19:25

## 2025-08-10 RX ADMIN — METHYLPREDNISOLONE ACETATE 60 MILLIGRAM(S): 80 INJECTION, SUSPENSION INTRA-ARTICULAR; INTRALESIONAL; INTRAMUSCULAR; SOFT TISSUE at 12:15

## 2025-08-10 RX ADMIN — Medication 125 MILLILITER(S): at 09:55

## 2025-08-10 RX ADMIN — Medication 125 MILLILITER(S): at 19:25

## 2025-08-10 RX ADMIN — IMMUNE GLOBULIN (HUMAN) 133.33 GRAM(S): 10 INJECTION INTRAVENOUS; SUBCUTANEOUS at 22:39

## 2025-08-10 RX ADMIN — Medication 400 MILLIGRAM(S): at 17:37

## 2025-08-10 RX ADMIN — KETOROLAC TROMETHAMINE 15 MILLIGRAM(S): 30 INJECTION, SOLUTION INTRAMUSCULAR; INTRAVENOUS at 12:23

## 2025-08-11 LAB
ALBUMIN SERPL ELPH-MCNC: 3.9 G/DL — SIGNIFICANT CHANGE UP (ref 3.3–5)
ALP SERPL-CCNC: 58 U/L — SIGNIFICANT CHANGE UP (ref 40–120)
ALT FLD-CCNC: 14 U/L — SIGNIFICANT CHANGE UP (ref 10–45)
ANION GAP SERPL CALC-SCNC: 11 MMOL/L — SIGNIFICANT CHANGE UP (ref 5–17)
AST SERPL-CCNC: 10 U/L — SIGNIFICANT CHANGE UP (ref 10–40)
BASOPHILS # BLD AUTO: 0.02 K/UL — SIGNIFICANT CHANGE UP (ref 0–0.2)
BASOPHILS # BLD MANUAL: 0 K/UL — SIGNIFICANT CHANGE UP (ref 0–0.2)
BASOPHILS NFR BLD AUTO: 0.2 % — SIGNIFICANT CHANGE UP (ref 0–2)
BASOPHILS NFR BLD MANUAL: 0 % — SIGNIFICANT CHANGE UP (ref 0–2)
BILIRUB SERPL-MCNC: 0.4 MG/DL — SIGNIFICANT CHANGE UP (ref 0.2–1.2)
BUN SERPL-MCNC: 20 MG/DL — SIGNIFICANT CHANGE UP (ref 7–23)
CALCIUM SERPL-MCNC: 9.7 MG/DL — SIGNIFICANT CHANGE UP (ref 8.4–10.5)
CHLORIDE SERPL-SCNC: 105 MMOL/L — SIGNIFICANT CHANGE UP (ref 96–108)
CO2 SERPL-SCNC: 24 MMOL/L — SIGNIFICANT CHANGE UP (ref 22–31)
CREAT SERPL-MCNC: 0.94 MG/DL — SIGNIFICANT CHANGE UP (ref 0.5–1.3)
EGFR: 113 ML/MIN/1.73M2 — SIGNIFICANT CHANGE UP
EGFR: 113 ML/MIN/1.73M2 — SIGNIFICANT CHANGE UP
EOSINOPHIL # BLD AUTO: 0 K/UL — SIGNIFICANT CHANGE UP (ref 0–0.5)
EOSINOPHIL # BLD MANUAL: 0 K/UL — SIGNIFICANT CHANGE UP (ref 0–0.5)
EOSINOPHIL NFR BLD AUTO: 0 % — SIGNIFICANT CHANGE UP (ref 0–6)
EOSINOPHIL NFR BLD MANUAL: 0 % — SIGNIFICANT CHANGE UP (ref 0–6)
GLUCOSE SERPL-MCNC: 117 MG/DL — HIGH (ref 70–99)
HCT VFR BLD CALC: 38.1 % — LOW (ref 39–50)
HGB BLD-MCNC: 12.6 G/DL — LOW (ref 13–17)
IGA FLD-MCNC: 227 MG/DL — SIGNIFICANT CHANGE UP (ref 84–499)
IGA FLD-MCNC: 235 MG/DL — SIGNIFICANT CHANGE UP (ref 84–499)
IGG FLD-MCNC: 2574 MG/DL — HIGH (ref 610–1660)
IGM SERPL-MCNC: 136 MG/DL — SIGNIFICANT CHANGE UP (ref 35–242)
IMM GRANULOCYTES # BLD AUTO: 0.03 K/UL — SIGNIFICANT CHANGE UP (ref 0–0.07)
IMM GRANULOCYTES NFR BLD AUTO: 0.4 % — SIGNIFICANT CHANGE UP (ref 0–0.9)
KAPPA LC SER QL IFE: 1.38 MG/DL — SIGNIFICANT CHANGE UP (ref 0.33–1.94)
KAPPA/LAMBDA FREE LIGHT CHAIN RATIO, SERUM: 0.99 RATIO — SIGNIFICANT CHANGE UP (ref 0.26–1.65)
LAMBDA LC SER QL IFE: 1.4 MG/DL — SIGNIFICANT CHANGE UP (ref 0.57–2.63)
LYMPHOCYTES # BLD AUTO: 1.3 K/UL — SIGNIFICANT CHANGE UP (ref 1–3.3)
LYMPHOCYTES # BLD MANUAL: 0.65 K/UL — LOW (ref 1–3.3)
LYMPHOCYTES NFR BLD AUTO: 15.6 % — SIGNIFICANT CHANGE UP (ref 13–44)
LYMPHOCYTES NFR BLD MANUAL: 7.8 % — LOW (ref 13–44)
MAGNESIUM SERPL-MCNC: 2.2 MG/DL — SIGNIFICANT CHANGE UP (ref 1.6–2.6)
MANUAL NEUTROPHIL BANDS #: 0.07 K/UL — SIGNIFICANT CHANGE UP (ref 0–0.84)
MANUAL REACTIVE LYMPHOCYTES #: 0.07 K/UL — SIGNIFICANT CHANGE UP (ref 0–0.63)
MCHC RBC-ENTMCNC: 28.7 PG — SIGNIFICANT CHANGE UP (ref 27–34)
MCHC RBC-ENTMCNC: 33.1 G/DL — SIGNIFICANT CHANGE UP (ref 32–36)
MCV RBC AUTO: 86.8 FL — SIGNIFICANT CHANGE UP (ref 80–100)
MONOCYTES # BLD AUTO: 0.3 K/UL — SIGNIFICANT CHANGE UP (ref 0–0.9)
MONOCYTES # BLD MANUAL: 0.14 K/UL — SIGNIFICANT CHANGE UP (ref 0–0.9)
MONOCYTES NFR BLD AUTO: 3.6 % — SIGNIFICANT CHANGE UP (ref 2–14)
MONOCYTES NFR BLD MANUAL: 1.7 % — LOW (ref 2–14)
NEUTROPHILS # BLD AUTO: 6.68 K/UL — SIGNIFICANT CHANGE UP (ref 1.8–7.4)
NEUTROPHILS # BLD MANUAL: 7.39 K/UL — SIGNIFICANT CHANGE UP (ref 1.8–7.4)
NEUTROPHILS NFR BLD AUTO: 80.2 % — HIGH (ref 43–77)
NEUTROPHILS NFR BLD MANUAL: 88.7 % — HIGH (ref 43–77)
NEUTS BAND # BLD: 0.9 % — SIGNIFICANT CHANGE UP (ref 0–8)
NEUTS BAND NFR BLD: 0.9 % — SIGNIFICANT CHANGE UP (ref 0–8)
NRBC # BLD AUTO: 0 K/UL — SIGNIFICANT CHANGE UP (ref 0–0)
NRBC # FLD: 0 K/UL — SIGNIFICANT CHANGE UP (ref 0–0)
NRBC BLD AUTO-RTO: 0 /100 WBCS — SIGNIFICANT CHANGE UP (ref 0–0)
PHOSPHATE SERPL-MCNC: 2.5 MG/DL — SIGNIFICANT CHANGE UP (ref 2.5–4.5)
PLAT MORPH BLD: NORMAL — SIGNIFICANT CHANGE UP
PLATELET # BLD AUTO: 247 K/UL — SIGNIFICANT CHANGE UP (ref 150–400)
PMV BLD: 9.1 FL — SIGNIFICANT CHANGE UP (ref 7–13)
POTASSIUM SERPL-MCNC: 3.8 MMOL/L — SIGNIFICANT CHANGE UP (ref 3.5–5.3)
POTASSIUM SERPL-SCNC: 3.8 MMOL/L — SIGNIFICANT CHANGE UP (ref 3.5–5.3)
PROCALCITONIN SERPL-MCNC: 0.03 NG/ML — SIGNIFICANT CHANGE UP (ref 0.02–0.1)
PROT SERPL-MCNC: 8.4 G/DL — HIGH (ref 6–8.3)
RBC # BLD: 4.39 M/UL — SIGNIFICANT CHANGE UP (ref 4.2–5.8)
RBC # FLD: 11.5 % — SIGNIFICANT CHANGE UP (ref 10.3–14.5)
RBC BLD AUTO: NORMAL — SIGNIFICANT CHANGE UP
SODIUM SERPL-SCNC: 140 MMOL/L — SIGNIFICANT CHANGE UP (ref 135–145)
TROPONIN T, HIGH SENSITIVITY RESULT: <6 NG/L — SIGNIFICANT CHANGE UP (ref 0–51)
TSH SERPL-MCNC: 0.82 UIU/ML — SIGNIFICANT CHANGE UP (ref 0.27–4.2)
VARIANT LYMPHS # BLD: 0.9 % — SIGNIFICANT CHANGE UP (ref 0–6)
VARIANT LYMPHS NFR BLD MANUAL: 0.9 % — SIGNIFICANT CHANGE UP (ref 0–6)
WBC # BLD: 8.33 K/UL — SIGNIFICANT CHANGE UP (ref 3.8–10.5)
WBC # FLD AUTO: 8.33 K/UL — SIGNIFICANT CHANGE UP (ref 3.8–10.5)

## 2025-08-11 PROCEDURE — 85014 HEMATOCRIT: CPT

## 2025-08-11 PROCEDURE — 84484 ASSAY OF TROPONIN QUANT: CPT

## 2025-08-11 PROCEDURE — 83605 ASSAY OF LACTIC ACID: CPT

## 2025-08-11 PROCEDURE — 85652 RBC SED RATE AUTOMATED: CPT

## 2025-08-11 PROCEDURE — 82947 ASSAY GLUCOSE BLOOD QUANT: CPT

## 2025-08-11 PROCEDURE — 83521 IG LIGHT CHAINS FREE EACH: CPT

## 2025-08-11 PROCEDURE — 85018 HEMOGLOBIN: CPT

## 2025-08-11 PROCEDURE — 84443 ASSAY THYROID STIM HORMONE: CPT

## 2025-08-11 PROCEDURE — 84295 ASSAY OF SERUM SODIUM: CPT

## 2025-08-11 PROCEDURE — 80053 COMPREHEN METABOLIC PANEL: CPT

## 2025-08-11 PROCEDURE — 82330 ASSAY OF CALCIUM: CPT

## 2025-08-11 PROCEDURE — 82784 ASSAY IGA/IGD/IGG/IGM EACH: CPT

## 2025-08-11 PROCEDURE — 84132 ASSAY OF SERUM POTASSIUM: CPT

## 2025-08-11 PROCEDURE — 94640 AIRWAY INHALATION TREATMENT: CPT

## 2025-08-11 PROCEDURE — 85025 COMPLETE CBC W/AUTO DIFF WBC: CPT

## 2025-08-11 PROCEDURE — 82435 ASSAY OF BLOOD CHLORIDE: CPT

## 2025-08-11 PROCEDURE — 82803 BLOOD GASES ANY COMBINATION: CPT

## 2025-08-11 PROCEDURE — 99233 SBSQ HOSP IP/OBS HIGH 50: CPT | Mod: GC

## 2025-08-11 PROCEDURE — 87637 SARSCOV2&INF A&B&RSV AMP PRB: CPT

## 2025-08-11 PROCEDURE — 84145 PROCALCITONIN (PCT): CPT

## 2025-08-11 PROCEDURE — 86140 C-REACTIVE PROTEIN: CPT

## 2025-08-11 PROCEDURE — 83735 ASSAY OF MAGNESIUM: CPT

## 2025-08-11 PROCEDURE — 84100 ASSAY OF PHOSPHORUS: CPT

## 2025-08-11 RX ORDER — IMMUNE GLOBULIN (HUMAN) 10 G/100ML
80 INJECTION INTRAVENOUS; SUBCUTANEOUS ONCE
Refills: 0 | Status: COMPLETED | OUTPATIENT
Start: 2025-08-11 | End: 2025-08-11

## 2025-08-11 RX ORDER — DIPHENHYDRAMINE HCL 12.5MG/5ML
25 ELIXIR ORAL ONCE
Refills: 0 | Status: COMPLETED | OUTPATIENT
Start: 2025-08-11 | End: 2025-08-11

## 2025-08-11 RX ORDER — ACETAMINOPHEN 500 MG/5ML
650 LIQUID (ML) ORAL ONCE
Refills: 0 | Status: COMPLETED | OUTPATIENT
Start: 2025-08-11 | End: 2025-08-11

## 2025-08-11 RX ADMIN — PREDNISONE 60 MILLIGRAM(S): 20 TABLET ORAL at 04:47

## 2025-08-11 RX ADMIN — Medication 2 SPRAY(S): at 23:05

## 2025-08-11 RX ADMIN — Medication 1 DROP(S): at 18:27

## 2025-08-11 RX ADMIN — Medication 2 SPRAY(S): at 18:25

## 2025-08-11 RX ADMIN — KETOROLAC TROMETHAMINE 15 MILLIGRAM(S): 30 INJECTION, SOLUTION INTRAMUSCULAR; INTRAVENOUS at 18:31

## 2025-08-11 RX ADMIN — Medication 2 SPRAY(S): at 04:47

## 2025-08-11 RX ADMIN — Medication 2 SPRAY(S): at 12:42

## 2025-08-11 RX ADMIN — KETOROLAC TROMETHAMINE 15 MILLIGRAM(S): 30 INJECTION, SOLUTION INTRAMUSCULAR; INTRAVENOUS at 19:15

## 2025-08-11 RX ADMIN — Medication 1 DROP(S): at 23:06

## 2025-08-11 RX ADMIN — Medication 1 DROP(S): at 04:46

## 2025-08-11 RX ADMIN — ERYTHROMYCIN 1 APPLICATION(S): 5 OINTMENT OPHTHALMIC at 20:21

## 2025-08-11 RX ADMIN — Medication 1 DROP(S): at 12:43

## 2025-08-11 RX ADMIN — IMMUNE GLOBULIN (HUMAN) 133.33 GRAM(S): 10 INJECTION INTRAVENOUS; SUBCUTANEOUS at 21:03

## 2025-08-11 RX ADMIN — KETOROLAC TROMETHAMINE 15 MILLIGRAM(S): 30 INJECTION, SOLUTION INTRAMUSCULAR; INTRAVENOUS at 12:43

## 2025-08-11 RX ADMIN — Medication 650 MILLIGRAM(S): at 21:00

## 2025-08-11 RX ADMIN — Medication 650 MILLIGRAM(S): at 20:20

## 2025-08-11 RX ADMIN — FLUTICASONE PROPIONATE 1 SPRAY(S): 50 SPRAY, METERED NASAL at 18:25

## 2025-08-11 RX ADMIN — FLUTICASONE PROPIONATE 1 SPRAY(S): 50 SPRAY, METERED NASAL at 04:47

## 2025-08-11 RX ADMIN — KETOROLAC TROMETHAMINE 15 MILLIGRAM(S): 30 INJECTION, SOLUTION INTRAMUSCULAR; INTRAVENOUS at 13:40

## 2025-08-11 RX ADMIN — Medication 25 MILLIGRAM(S): at 20:20

## 2025-08-12 ENCOUNTER — TRANSCRIPTION ENCOUNTER (OUTPATIENT)
Age: 30
End: 2025-08-12

## 2025-08-12 VITALS
HEART RATE: 70 BPM | OXYGEN SATURATION: 96 % | SYSTOLIC BLOOD PRESSURE: 127 MMHG | RESPIRATION RATE: 18 BRPM | DIASTOLIC BLOOD PRESSURE: 73 MMHG | TEMPERATURE: 98 F

## 2025-08-12 LAB
HCT VFR BLD CALC: 36.8 % — LOW (ref 39–50)
HGB BLD-MCNC: 12.2 G/DL — LOW (ref 13–17)
MCHC RBC-ENTMCNC: 29 PG — SIGNIFICANT CHANGE UP (ref 27–34)
MCHC RBC-ENTMCNC: 33.2 G/DL — SIGNIFICANT CHANGE UP (ref 32–36)
MCV RBC AUTO: 87.6 FL — SIGNIFICANT CHANGE UP (ref 80–100)
NRBC # BLD AUTO: 0 K/UL — SIGNIFICANT CHANGE UP (ref 0–0)
NRBC # FLD: 0 K/UL — SIGNIFICANT CHANGE UP (ref 0–0)
NRBC BLD AUTO-RTO: 0 /100 WBCS — SIGNIFICANT CHANGE UP (ref 0–0)
PLATELET # BLD AUTO: 214 K/UL — SIGNIFICANT CHANGE UP (ref 150–400)
PMV BLD: 9.2 FL — SIGNIFICANT CHANGE UP (ref 7–13)
RBC # BLD: 4.2 M/UL — SIGNIFICANT CHANGE UP (ref 4.2–5.8)
RBC # FLD: 11.9 % — SIGNIFICANT CHANGE UP (ref 10.3–14.5)
WBC # BLD: 6.9 K/UL — SIGNIFICANT CHANGE UP (ref 3.8–10.5)
WBC # FLD AUTO: 6.9 K/UL — SIGNIFICANT CHANGE UP (ref 3.8–10.5)

## 2025-08-12 PROCEDURE — 85652 RBC SED RATE AUTOMATED: CPT

## 2025-08-12 PROCEDURE — 87637 SARSCOV2&INF A&B&RSV AMP PRB: CPT

## 2025-08-12 PROCEDURE — 82947 ASSAY GLUCOSE BLOOD QUANT: CPT

## 2025-08-12 PROCEDURE — 82784 ASSAY IGA/IGD/IGG/IGM EACH: CPT

## 2025-08-12 PROCEDURE — 86140 C-REACTIVE PROTEIN: CPT

## 2025-08-12 PROCEDURE — 85025 COMPLETE CBC W/AUTO DIFF WBC: CPT

## 2025-08-12 PROCEDURE — 84295 ASSAY OF SERUM SODIUM: CPT

## 2025-08-12 PROCEDURE — 84484 ASSAY OF TROPONIN QUANT: CPT

## 2025-08-12 PROCEDURE — 99285 EMERGENCY DEPT VISIT HI MDM: CPT | Mod: 25

## 2025-08-12 PROCEDURE — 83521 IG LIGHT CHAINS FREE EACH: CPT

## 2025-08-12 PROCEDURE — 80053 COMPREHEN METABOLIC PANEL: CPT

## 2025-08-12 PROCEDURE — 83605 ASSAY OF LACTIC ACID: CPT

## 2025-08-12 PROCEDURE — 96374 THER/PROPH/DIAG INJ IV PUSH: CPT

## 2025-08-12 PROCEDURE — 82330 ASSAY OF CALCIUM: CPT

## 2025-08-12 PROCEDURE — 85018 HEMOGLOBIN: CPT

## 2025-08-12 PROCEDURE — 83735 ASSAY OF MAGNESIUM: CPT

## 2025-08-12 PROCEDURE — 84443 ASSAY THYROID STIM HORMONE: CPT

## 2025-08-12 PROCEDURE — 84100 ASSAY OF PHOSPHORUS: CPT

## 2025-08-12 PROCEDURE — 85014 HEMATOCRIT: CPT

## 2025-08-12 PROCEDURE — 85027 COMPLETE CBC AUTOMATED: CPT

## 2025-08-12 PROCEDURE — 84132 ASSAY OF SERUM POTASSIUM: CPT

## 2025-08-12 PROCEDURE — 84145 PROCALCITONIN (PCT): CPT

## 2025-08-12 PROCEDURE — 82435 ASSAY OF BLOOD CHLORIDE: CPT

## 2025-08-12 PROCEDURE — 82803 BLOOD GASES ANY COMBINATION: CPT

## 2025-08-12 PROCEDURE — 94640 AIRWAY INHALATION TREATMENT: CPT

## 2025-08-12 RX ORDER — LANOLIN/MINERAL OIL/PETROLATUM
1 OINTMENT (GRAM) OPHTHALMIC (EYE)
Qty: 0 | Refills: 0 | DISCHARGE
Start: 2025-08-12

## 2025-08-12 RX ORDER — ERYTHROMYCIN 5 MG/G
1 OINTMENT OPHTHALMIC
Qty: 1 | Refills: 0
Start: 2025-08-12 | End: 2025-08-25

## 2025-08-12 RX ORDER — PREDNISONE 20 MG/1
3 TABLET ORAL
Qty: 21 | Refills: 0
Start: 2025-08-12 | End: 2025-08-18

## 2025-08-12 RX ADMIN — Medication 2 SPRAY(S): at 05:24

## 2025-08-12 RX ADMIN — Medication 2 SPRAY(S): at 13:21

## 2025-08-12 RX ADMIN — PREDNISONE 60 MILLIGRAM(S): 20 TABLET ORAL at 05:23

## 2025-08-12 RX ADMIN — FLUTICASONE PROPIONATE 1 SPRAY(S): 50 SPRAY, METERED NASAL at 05:24

## 2025-08-12 RX ADMIN — Medication 1 DROP(S): at 13:21

## 2025-08-12 RX ADMIN — Medication 1 DROP(S): at 05:23

## 2025-08-18 ENCOUNTER — APPOINTMENT (OUTPATIENT)
Dept: DERMATOLOGY | Facility: CLINIC | Age: 30
End: 2025-08-18

## 2025-08-18 VITALS — WEIGHT: 220 LBS | HEIGHT: 73 IN | BODY MASS INDEX: 29.16 KG/M2

## 2025-08-18 DIAGNOSIS — L30.9 DERMATITIS, UNSPECIFIED: ICD-10-CM

## 2025-08-18 PROCEDURE — 99213 OFFICE O/P EST LOW 20 MIN: CPT

## 2025-08-18 RX ORDER — PREDNISONE 10 MG/1
10 TABLET ORAL
Qty: 65 | Refills: 0 | Status: ACTIVE | COMMUNITY
Start: 2025-08-18 | End: 1900-01-01